# Patient Record
Sex: MALE | Race: WHITE | NOT HISPANIC OR LATINO | Employment: OTHER | URBAN - METROPOLITAN AREA
[De-identification: names, ages, dates, MRNs, and addresses within clinical notes are randomized per-mention and may not be internally consistent; named-entity substitution may affect disease eponyms.]

---

## 2017-01-02 ENCOUNTER — HOME CARE VISIT (OUTPATIENT)
Dept: HOME HEALTH SERVICES | Facility: HOME HEALTHCARE | Age: 77
End: 2017-01-02
Payer: MEDICARE

## 2017-01-02 VITALS — HEART RATE: 80 BPM | DIASTOLIC BLOOD PRESSURE: 60 MMHG | TEMPERATURE: 98.6 F | SYSTOLIC BLOOD PRESSURE: 120 MMHG

## 2017-01-02 PROCEDURE — G0157 HHC PT ASSISTANT EA 15: HCPCS

## 2017-01-03 RX ORDER — MIRTAZAPINE 15 MG/1
TABLET, FILM COATED ORAL
Qty: 30 TAB | Refills: 0 | Status: SHIPPED | OUTPATIENT
Start: 2017-01-03 | End: 2017-03-03 | Stop reason: SDUPTHER

## 2017-01-03 RX ORDER — CLOPIDOGREL BISULFATE 75 MG/1
TABLET ORAL
Qty: 30 TAB | Refills: 0 | Status: SHIPPED | OUTPATIENT
Start: 2017-01-03 | End: 2017-03-03 | Stop reason: SDUPTHER

## 2017-01-03 RX ORDER — DONEPEZIL HYDROCHLORIDE 10 MG/1
TABLET, FILM COATED ORAL
Qty: 30 TAB | Refills: 0 | Status: SHIPPED | OUTPATIENT
Start: 2017-01-03 | End: 2017-03-03 | Stop reason: SDUPTHER

## 2017-01-03 RX ORDER — LISINOPRIL 20 MG/1
TABLET ORAL
Qty: 30 TAB | Refills: 0 | Status: SHIPPED | OUTPATIENT
Start: 2017-01-03 | End: 2017-03-03 | Stop reason: SDUPTHER

## 2017-01-04 ENCOUNTER — HOME CARE VISIT (OUTPATIENT)
Dept: HOME HEALTH SERVICES | Facility: HOME HEALTHCARE | Age: 77
End: 2017-01-04
Payer: MEDICARE

## 2017-01-04 PROCEDURE — G0494 LPN CARE EA 15MIN HH/HOSPICE: HCPCS

## 2017-01-05 ENCOUNTER — HOME CARE VISIT (OUTPATIENT)
Dept: HOME HEALTH SERVICES | Facility: HOME HEALTHCARE | Age: 77
End: 2017-01-05
Payer: MEDICARE

## 2017-01-05 VITALS
DIASTOLIC BLOOD PRESSURE: 81 MMHG | TEMPERATURE: 99.7 F | BODY MASS INDEX: 22.4 KG/M2 | SYSTOLIC BLOOD PRESSURE: 131 MMHG | HEART RATE: 81 BPM | RESPIRATION RATE: 18 BRPM | WEIGHT: 174.5 LBS

## 2017-01-05 VITALS
HEART RATE: 80 BPM | DIASTOLIC BLOOD PRESSURE: 60 MMHG | SYSTOLIC BLOOD PRESSURE: 132 MMHG | TEMPERATURE: 100.1 F | RESPIRATION RATE: 16 BRPM

## 2017-01-05 PROCEDURE — G0151 HHCP-SERV OF PT,EA 15 MIN: HCPCS

## 2017-01-05 SDOH — ECONOMIC STABILITY: HOUSING INSECURITY: UNSAFE APPLIANCES: 0

## 2017-01-05 SDOH — ECONOMIC STABILITY: HOUSING INSECURITY: UNSAFE COOKING RANGE AREA: 0

## 2017-01-05 ASSESSMENT — BALANCE ASSESSMENTS
SURVEY COMPLETE: TRUE
SITTING BALANCE: 1
IMMEDIATE STANDING BALANCE FIRST 5 SECONDS: 1
EYES CLOSED AT MAXIMUM POSITION NUDGED: 1
ATTEMPTS TO ARISE: 1
BALANCE SCORE: 12
ARISES: 2
STANDING BALANCE: 1
SITTING DOWN: 2
TURNING 360 DEGREES STEADINESS: 1
TURNING 360 DEGREES STEPS: 0
NUDGED: 2

## 2017-01-05 ASSESSMENT — GAIT ASSESSMENTS
TRUNK: 0
PATH: 1
LEFT STEP CLEAR: 1
STEP CONTINUITY: 1
SURVEY COMPLETE: TRUE
TIME: 0
RIGHT STEP CLEAR: 1
INITIATION OF GAIT IMMEDIATELY AFTER GO: 1
LEFT STEP PASS: 1
RIGHT STEP PASS: 1
GAIT SCORE: 8
STEP SYMMETRY: 1
BALANCE AND GAIT SCORE: 20

## 2017-01-05 ASSESSMENT — ACTIVITIES OF DAILY LIVING (ADL): TRANSPORTATION COMMENTS: FALL RISK

## 2017-01-05 ASSESSMENT — ENCOUNTER SYMPTOMS
DIFFICULTY THINKING: 1
SEVERE DYSPNEA: 1

## 2017-01-11 ENCOUNTER — HOME CARE VISIT (OUTPATIENT)
Dept: HOME HEALTH SERVICES | Facility: HOME HEALTHCARE | Age: 77
End: 2017-01-11
Payer: MEDICARE

## 2017-01-11 VITALS
DIASTOLIC BLOOD PRESSURE: 70 MMHG | RESPIRATION RATE: 16 BRPM | HEART RATE: 83 BPM | TEMPERATURE: 99.5 F | SYSTOLIC BLOOD PRESSURE: 120 MMHG

## 2017-01-11 PROCEDURE — G0162 HHC RN E&M PLAN SVS, 15 MIN: HCPCS

## 2017-01-11 SDOH — ECONOMIC STABILITY: HOUSING INSECURITY: UNSAFE COOKING RANGE AREA: 0

## 2017-01-11 SDOH — ECONOMIC STABILITY: HOUSING INSECURITY: UNSAFE APPLIANCES: 0

## 2017-01-11 ASSESSMENT — ACTIVITIES OF DAILY LIVING (ADL)
HOME_HEALTH_OASIS: 01
OASIS_M1830: 00
HOME_HEALTH_OASIS: 00

## 2017-05-15 ENCOUNTER — OFFICE VISIT (OUTPATIENT)
Dept: NEUROLOGY | Facility: MEDICAL CENTER | Age: 77
End: 2017-05-15
Payer: MEDICARE

## 2017-05-15 VITALS
WEIGHT: 173 LBS | TEMPERATURE: 98.8 F | DIASTOLIC BLOOD PRESSURE: 78 MMHG | SYSTOLIC BLOOD PRESSURE: 142 MMHG | HEIGHT: 74 IN | BODY MASS INDEX: 22.2 KG/M2 | HEART RATE: 92 BPM | OXYGEN SATURATION: 98 %

## 2017-05-15 DIAGNOSIS — F02.80 DEMENTIA ASSOCIATED WITH OTHER UNDERLYING DISEASE WITHOUT BEHAVIORAL DISTURBANCE (HCC): Primary | ICD-10-CM

## 2017-05-15 PROCEDURE — 99213 OFFICE O/P EST LOW 20 MIN: CPT | Performed by: PSYCHIATRY & NEUROLOGY

## 2017-05-15 PROCEDURE — G8432 DEP SCR NOT DOC, RNG: HCPCS | Performed by: PSYCHIATRY & NEUROLOGY

## 2017-05-15 PROCEDURE — 1100F PTFALLS ASSESS-DOCD GE2>/YR: CPT | Performed by: PSYCHIATRY & NEUROLOGY

## 2017-05-15 PROCEDURE — 4040F PNEUMOC VAC/ADMIN/RCVD: CPT | Performed by: PSYCHIATRY & NEUROLOGY

## 2017-05-15 PROCEDURE — G8420 CALC BMI NORM PARAMETERS: HCPCS | Performed by: PSYCHIATRY & NEUROLOGY

## 2017-05-15 PROCEDURE — G8598 ASA/ANTIPLAT THER USED: HCPCS | Performed by: PSYCHIATRY & NEUROLOGY

## 2017-05-15 PROCEDURE — 0518F FALL PLAN OF CARE DOCD: CPT | Mod: 8P | Performed by: PSYCHIATRY & NEUROLOGY

## 2017-05-15 PROCEDURE — 1036F TOBACCO NON-USER: CPT | Performed by: PSYCHIATRY & NEUROLOGY

## 2017-05-15 PROCEDURE — 3288F FALL RISK ASSESSMENT DOCD: CPT | Performed by: PSYCHIATRY & NEUROLOGY

## 2017-05-15 RX ORDER — DONEPEZIL HYDROCHLORIDE 10 MG/1
TABLET, FILM COATED ORAL
Qty: 30 TAB | Refills: 11 | Status: SHIPPED | OUTPATIENT
Start: 2017-05-15 | End: 2018-01-01 | Stop reason: SDUPTHER

## 2017-05-15 RX ORDER — MEMANTINE HYDROCHLORIDE 28 MG/1
28 CAPSULE, EXTENDED RELEASE ORAL DAILY
Qty: 30 CAP | Refills: 11 | Status: SHIPPED | OUTPATIENT
Start: 2017-05-15 | End: 2018-01-01 | Stop reason: SDUPTHER

## 2017-05-15 NOTE — MR AVS SNAPSHOT
"        Kailash Haile   5/15/2017 10:40 AM   Office Visit   MRN: [id number]    Department:  [facility]   Dept Phone:  [phone]    Description:  Male : 1940   Provider:  Aayush Benavidez M.D.           Reason for Visit     Follow-Up Dementia      Allergies as of 5/15/2017     Allergen Noted Reactions    Ambien [Kdc:Red Dye+Ci Pigment Blue 63+Zolpidem] 2014       Daytime confusion      You were diagnosed with     Dementia associated with other underlying disease without behavioral disturbance   [[id number]]  -  Primary       Vital Signs     Blood Pressure Pulse Temperature Height Weight Body Mass Index    142/78 mmHg 92 37.1 °C (98.8 °F) 1.88 m (6' 2\") 78.472 kg (173 lb) 22.20 kg/m2    Oxygen Saturation Smoking Status                98% Former Smoker          Basic Information     Date Of Birth Sex Race Ethnicity Preferred Language    1940 Male White Non- English      Your appointments     2017 10:00 AM   Established Patient with Rick Wang M.D.   [facility] [address]    [address]  [facility] [address]  [facility] [address]   [phone]           You will be receiving a confirmation call a few days before your appointment from our automated call confirmation system.              Problem List              ICD-10-CM Priority Class Noted - Resolved    CAD (coronary artery disease) I25.10   Unknown - Present    BPH (benign prostatic hyperplasia) N40.0   11/10/2011 - Present    Essential hypertension I10   2012 - Present    COPD (chronic obstructive pulmonary disease) (CMS-HCC) J44.9   2012 - Present    Pulmonary hypertension (CMS-HCC) I27.2   2012 - Present    History of CVA (cerebrovascular accident) Z86.73   2013 - Present    History of MI (myocardial infarction) I25.2   9/3/2014 - Present    Hemiplegia (CMS-HCC) G81.90 High  9/3/2014 - Present    Senile cataract H25.9   2015 - Present    Insomnia G47.00   2015 - " Present    Risk for falls Z91.81   5/5/2015 - Present    Dementia associated with other underlying disease without behavioral disturbance F02.80   12/6/2016 - Present      Health Maintenance        Date Due Completion Dates    IMM ZOSTER VACCINE 5/15/2000 ---    IMM PNEUMOCOCCAL 65+ (ADULT) LOW/MEDIUM RISK SERIES (2 of 2 - PCV13) 9/5/2013 9/5/2012    IMM DTaP/Tdap/Td Vaccine (2 - Td) 9/5/2022 9/5/2012            Current Immunizations     Influenza TIV (IM) 9/29/2013, 9/3/2012, 11/12/2011    Influenza Vaccine Adult HD 9/30/2016    Pneumococcal polysaccharide vaccine (PPSV-23) 9/5/2012    Tdap Vaccine 9/5/2012    Tuberculin Skin Test 1/21/2013, 6/15/2012      Below and/or attached are the medications your provider expects you to take. Review all of your home medications and newly ordered medications with your provider and/or pharmacist. Follow medication instructions as directed by your provider and/or pharmacist. Please keep your medication list with you and share with your provider. Update the information when medications are discontinued, doses are changed, or new medications (including over-the-counter products) are added; and carry medication information at all times in the event of emergency situations     Allergies:  AMBIEN - (reactions not documented)               Medications  Valid as of: May 15, 2017 - 11:13 AM    Generic Name Brand Name Tablet Size Instructions for use    Amoxicillin (Cap) AMOXIL 500 MG Take 500 mg by mouth every day at 6 PM.        Celecoxib (Cap) CELEBREX 200 MG Take 1 Cap by mouth every day.        Cholecalciferol (Tab) vitamin D 2000 UNIT TAKE 1 TABLET BY MOUTH ONCE DAILY.        Clopidogrel Bisulfate (Tab) PLAVIX 75 MG TAKE 1 TABLET BY MOUTH ONCE DAILY.        Diclofenac Sodium (Gel) Diclofenac Sodium 1 % Apply 1 Inch to skin as directed 4 times a day.        Donepezil HCl (Tab) ARICEPT 10 MG TAKE 1 TABLET BY MOUTH ONCE DAILY.        Lisinopril (Tab) PRINIVIL 20 MG TAKE 1 TABLET BY  MOUTH ONCE DAILY.        Melatonin (Tab) melatonin 3 MG Take 3 mg by mouth every day. at bedtime for sleep        Memantine HCl (CAPSULE SR 24 HR) NAMENDA 28 MG Take 1 Cap by mouth every day.        Mirtazapine (Tab) REMERON 15 MG TAKE 1 TABLET BY MOUTH AT BEDTIME.        Simvastatin (Tab) ZOCOR 20 MG TAKE 1 TABLET BY MOUTH IN THE EVENING.        .                 Medicines prescribed today were sent to:     [facility] - [place], [place] - [address] [facility]    [address] [place] [address]    Phone: [phone] Fax: [phone]    Open 24 Hours?: No      Medication refill instructions:       If your prescription bottle indicates you have medication refills left, it is not necessary to call your provider’s office. Please contact your pharmacy and they will refill your medication.    If your prescription bottle indicates you do not have any refills left, you may request refills at any time through one of the following ways: The online [employer] system (except Urgent Care), by calling your provider’s office, or by asking your pharmacy to contact your provider’s office with a refill request. Medication refills are processed only during regular business hours and may not be available until the next business day. Your provider may request additional information or to have a follow-up visit with you prior to refilling your medication.   *Please Note: Medication refills are assigned a new Rx number when refilled electronically. Your pharmacy may indicate that no refills were authorized even though a new prescription for the same medication is available at the pharmacy. Please request the medicine by name with the pharmacy before contacting your provider for a refill.        Other Notes About Your Plan     This appointment is 6-6-16    I69.959 Hemiplegia following CVA  I27.9 Chronic Pulmonary Heart Disease    Query: Dx'd with Myelodysplastic Syndrome during acute stay in 2014--in your medical opinion  is this an active-valid dx D46.9  Patient is enrolled in [facility] with Dr Felipe Patton Access Code: Activation code not generated  Current Pooja Status: Active

## 2017-05-15 NOTE — PROGRESS NOTES
"Subjective:      Kailash Haile is a 77 y.o. male who presents with his caretaker Zohreh, for follow-up, with a history of Alzheimer's disease.     HPI    Kailash continues to do well at his residence facility, he still lives in his own apartment. He is requiring some more cueing comes to daily activities such as bathing. He is still dressing himself appropriately, but he is also not shaving as often. He is eating consistently. He has not been found wandering, there have been no reports of agitated delirium. He denies nightmares. He is still getting around using his walker, has not been falling with regularity. There are no issues with incontinence. He reads avidly, does attend some of the music engagements at the common room. He still tends to be by himself. Namenda XR was started 6 months ago, he tolerates the 28 mg daily dosing without issue. He is remaining on Aricept 10 mg daily at bedtime without issue throughout. Zohreh still accompanies him when he is out seeing physicians or otherwise being social, she will see him at the care facility where he is a resident since she has other clients as well. He does recognize her, but has difficulty remembering names.    There are no reports of transient focal neurologic deficit. He has been tolerating his Plavix and Zocor without issue.    Medical, surgical and family histories are reviewed, there are no new drug allergies. He is on Namenda XR 20 mg daily, Aricept 10 mg daily at bedtime, Remeron, Plavix, Zocor, Prinivil, the rest as per the electronic health record.    Review of Systems   Unable to perform ROS: dementia        Objective:     /78 mmHg  Pulse 92  Temp(Src) 37.1 °C (98.8 °F)  Ht 1.88 m (6' 2\")  Wt 78.472 kg (173 lb)  BMI 22.20 kg/m2  SpO2 98%     Physical Exam    He appears in no acute distress. Clean and appropriately dressed, he is quite cooperative. His vital signs are stable. There is no malar rash. The neck is supple. Cardiac " evaluation is unremarkable. He is now oriented only to place, even though today is his birthday, he cannot calculate his age appropriately. There is no aphasia or inattention. He does repeat himself throughout the interview. He can be a little perseverative. Visual fields are full, PERRLA/EOMI, there is no facial asymmetry or bulbar dysfunction. Hemiparesis is unchanged, he walks with a walker, there is still significant postural instability. There is no appendicular dystaxia.     Assessment/Plan:     1. Dementia associated with other underlying disease without behavioral disturbance  I will continue the Namenda and donepezil combination, hopefully we can maintain his level of stable independence for a while longer. He does not require other symptomatic relief at this time. They will follow-up in one year.    - Memantine HCl ER (NAMENDA XR) 28 MG CAPSULE SR 24 HR; Take 1 Cap by mouth every day.  Dispense: 30 Cap; Refill: 11  - donepezil (ARICEPT) 10 MG tablet; TAKE 1 TABLET BY MOUTH ONCE DAILY.  Dispense: 30 Tab; Refill: 11    Time: Evaluation of 20 minutes were exam, review, discussion, and education  Discussion: Assessment in assessment, over 50% of the time spent face-to-face counseling and correlating care

## 2017-06-08 ENCOUNTER — OFFICE VISIT (OUTPATIENT)
Dept: MEDICAL GROUP | Facility: MEDICAL CENTER | Age: 77
End: 2017-06-08
Payer: MEDICARE

## 2017-06-08 VITALS
RESPIRATION RATE: 16 BRPM | WEIGHT: 174 LBS | OXYGEN SATURATION: 94 % | BODY MASS INDEX: 22.33 KG/M2 | TEMPERATURE: 99 F | HEART RATE: 85 BPM | DIASTOLIC BLOOD PRESSURE: 68 MMHG | HEIGHT: 74 IN | SYSTOLIC BLOOD PRESSURE: 136 MMHG

## 2017-06-08 DIAGNOSIS — J44.9 CHRONIC OBSTRUCTIVE PULMONARY DISEASE, UNSPECIFIED COPD TYPE (HCC): ICD-10-CM

## 2017-06-08 DIAGNOSIS — I27.20 PULMONARY HYPERTENSION (HCC): ICD-10-CM

## 2017-06-08 DIAGNOSIS — Z86.73 HISTORY OF CVA (CEREBROVASCULAR ACCIDENT): ICD-10-CM

## 2017-06-08 DIAGNOSIS — I10 ESSENTIAL HYPERTENSION: ICD-10-CM

## 2017-06-08 DIAGNOSIS — F02.80 DEMENTIA ASSOCIATED WITH OTHER UNDERLYING DISEASE WITHOUT BEHAVIORAL DISTURBANCE (HCC): ICD-10-CM

## 2017-06-08 DIAGNOSIS — N40.1 BENIGN PROSTATIC HYPERPLASIA WITH LOWER URINARY TRACT SYMPTOMS, UNSPECIFIED MORPHOLOGY: ICD-10-CM

## 2017-06-08 DIAGNOSIS — Z91.81 RISK FOR FALLS: ICD-10-CM

## 2017-06-08 DIAGNOSIS — Z13.220 SCREENING, LIPID: ICD-10-CM

## 2017-06-08 DIAGNOSIS — I69.351 HEMIPARESIS AFFECTING RIGHT SIDE AS LATE EFFECT OF STROKE (HCC): ICD-10-CM

## 2017-06-08 DIAGNOSIS — I25.10 CORONARY ARTERY DISEASE INVOLVING NATIVE CORONARY ARTERY OF NATIVE HEART WITHOUT ANGINA PECTORIS: ICD-10-CM

## 2017-06-08 DIAGNOSIS — Z00.00 MEDICARE ANNUAL WELLNESS VISIT, SUBSEQUENT: ICD-10-CM

## 2017-06-08 DIAGNOSIS — F51.01 PRIMARY INSOMNIA: ICD-10-CM

## 2017-06-08 PROCEDURE — G8598 ASA/ANTIPLAT THER USED: HCPCS | Performed by: INTERNAL MEDICINE

## 2017-06-08 PROCEDURE — 1036F TOBACCO NON-USER: CPT | Performed by: INTERNAL MEDICINE

## 2017-06-08 PROCEDURE — 99213 OFFICE O/P EST LOW 20 MIN: CPT | Mod: 25 | Performed by: INTERNAL MEDICINE

## 2017-06-08 PROCEDURE — G8420 CALC BMI NORM PARAMETERS: HCPCS | Performed by: INTERNAL MEDICINE

## 2017-06-08 PROCEDURE — 1101F PT FALLS ASSESS-DOCD LE1/YR: CPT | Performed by: INTERNAL MEDICINE

## 2017-06-08 PROCEDURE — 4040F PNEUMOC VAC/ADMIN/RCVD: CPT | Performed by: INTERNAL MEDICINE

## 2017-06-08 PROCEDURE — G0439 PPPS, SUBSEQ VISIT: HCPCS | Performed by: INTERNAL MEDICINE

## 2017-06-08 ASSESSMENT — PATIENT HEALTH QUESTIONNAIRE - PHQ9: CLINICAL INTERPRETATION OF PHQ2 SCORE: 0

## 2017-06-08 NOTE — PROGRESS NOTES
CC: Follow-up hypertension due for wellness examination.    HPI:   Kailash presents today with the following.    1. Medicare annual wellness visit, subsequent  Screenings performed below. Patient presents with caregiver.    2. Essential hypertension  Blood pressure slightly high and neurologists office and initially in the 150s. He does not take blood pressure at home. Denying any chest pain or shortness breath no edema blood pressure did come down significantly on recheck.    3. Coronary artery disease involving native coronary artery of native heart without angina pectoris  Denying any chest pain or shortness of breath no edema.    4. Pulmonary hypertension (CMS-[facility])  Again asymptomatic.    5. Benign prostatic hyperplasia with lower urinary tract symptoms, unspecified morphology  Urinary symptoms are stable denying any increase in frequency.     6. Chronic obstructive pulmonary disease, unspecified COPD type (CMS-HCC)  Breathing is at baseline with no exacerbation.    7. Dementia associated with other underlying disease without behavioral disturbance  He is followed by neurology maintain on medication last visit one month ago and found to be stable. Still quite pleasant and conversational.    8. History of CVA (cerebrovascular accident)  Maintain on Plavix denying any new neurologic symptoms.    9. Hemiparesis affecting right side as late effect of stroke (CMS-HCC)  Still is ambulatory but is a fall risk is resolved haziness has used but decreased the severity and strength.    10. Risk for falls  Using a walker denying any recent falls.    11. Primary insomnia  Port sleep is doing well on current medications        Depression Screening    Little interest or pleasure in doing things?  0 - not at all  Feeling down, depressed , or hopeless?    Trouble falling or staying asleep, or sleeping too much?     Feeling tired or having little energy?     Poor appetite or overeating?     Feeling bad about yourself - or that  you are a failure or have let yourself or your family down?    Trouble concentrating on things, such as reading the newspaper or watching television?    Moving or speaking so slowly that other people could have noticed.  Or the opposite - being so fidgety or restless that you have been moving around a lot more than usual?     Thoughts that you would be better off dead, or of hurting yourself?     Patient Health Questionnaire Score:    If depressive symptoms identified deferred to follow up visit unless specifically addressed in assessment and plan.    Interpretation of PHQ-9 Total Score   Score Severity   1-4 Minimal Depression   5-9 Mild Depression   10-14 Moderate Depression   15-19 Moderately Severe Depression   20-27 Severe Depression    Screening for Cognitive Impairment    Three Minute Recall (banana, sunrise, fence)  1/3    Draw clock face with all 12 numbers set to the hand to show 10 minures past 11 o'clock  0    If cognitive concerns identified deferred to follow up visit unless specifically addressed in assessment and plan.    Fall Risk Assessment    Has the patient had two or more falls in the last year or any fall with injury in the last year?  No  If Fall Risk identified deferred to follow up visit unless specifically addressed in assessment and plan.    Safety Assessment    Throw rugs on floor.  No  Handrails on all stairs.  Yes  Good lighting in all hallways.  Yes  Difficulty hearing.  No  Patient counseled about all safety risks that were identified.    Functional Assessment ADLs    Are there any barriers preventing you from cooking for yourself or meeting nutritional needs?  No.    Are there any barriers preventing you from driving safely or obtaining transportation?  No.    Are there any barriers preventing you from using a telephone or calling for help?  No.    Are there any barriers preventing you from shopping?  No.    Are there any barriers preventing you from taking care of your own finances?   No.    Are there any barriers preventing you from managing your medications?  No.    Are currently engaing any exercise or physical activity?  Yes.       Health Maintenance Summary                PFT SCREENING-FEV1 AND FEV/FVC RATIO / SPIROMETRY SHOULD BE PERFORMED ANNUALLY Overdue 5/15/1958     IMM ZOSTER VACCINE Overdue 5/15/2000     Annual Wellness Visit Overdue 6/7/2017      Done 6/6/2016 Visit Dx: Medicare annual wellness visit, subsequent     Patient has more history with this topic...    IMM PNEUMOCOCCAL 65+ (ADULT) LOW/MEDIUM RISK SERIES Next Due 10/2/2017      Done 10/2/2016 Imm Admin: Pneumococcal polysaccharide vaccine (PPSV-23)     Patient has more history with this topic...    IMM DTaP/Tdap/Td Vaccine Next Due 9/5/2022      Done 9/5/2012 Imm Admin: Tdap Vaccine          Patient Care Team:  Rick Wang M.D. as PCP - General (Internal Medicine)  Rachna Hagen O.D. as Consulting Physician (Optometry)  Aayush Benavidez M.D. as Consulting Physician (Neurology)  Devon Horowitz M.D. as Consulting Physician (Orthopaedics)      Patient Active Problem List    Diagnosis Date Noted   • Benign prostatic hyperplasia with lower urinary tract symptoms 06/08/2017   • Chronic obstructive pulmonary disease (CMS-HCC) 06/08/2017   • Hemiparesis affecting right side as late effect of stroke (CMS-HCC) 06/08/2017   • Dementia associated with other underlying disease without behavioral disturbance 12/06/2016   • Risk for falls 05/05/2015   • Primary insomnia 04/14/2015   • Senile cataract 04/07/2015   • History of MI (myocardial infarction) 09/03/2014   • History of CVA (cerebrovascular accident) 12/27/2013   • Essential hypertension 05/28/2012   • Pulmonary hypertension (CMS-HCC) 05/28/2012   • CAD (coronary artery disease)        Current Outpatient Prescriptions   Medication Sig Dispense Refill   • celecoxib (CELEBREX) 200 MG Cap TAKE (1) CAPSULE BY MOUTH ONCE DAILY. 30 Cap 6   • Memantine HCl ER (NAMENDA XR) 28  "MG CAPSULE SR 24 HR Take 1 Cap by mouth every day. 30 Cap 11   • donepezil (ARICEPT) 10 MG tablet TAKE 1 TABLET BY MOUTH ONCE DAILY. 30 Tab 11   • simvastatin (ZOCOR) 20 MG Tab TAKE 1 TABLET BY MOUTH IN THE EVENING. 90 Tab 3   • Cholecalciferol (VITAMIN D) 2000 UNIT Tab TAKE 1 TABLET BY MOUTH ONCE DAILY. 30 Tab 3   • mirtazapine (REMERON) 15 MG Tab TAKE 1 TABLET BY MOUTH AT BEDTIME. 30 Tab 6   • clopidogrel (PLAVIX) 75 MG Tab TAKE 1 TABLET BY MOUTH ONCE DAILY. 30 Tab 11   • lisinopril (PRINIVIL) 20 MG Tab TAKE 1 TABLET BY MOUTH ONCE DAILY. 30 Tab 11   • melatonin 3 MG Tab Take 3 mg by mouth every day. at bedtime for sleep     • Diclofenac Sodium (VOLTAREN) 1 % Gel Apply 1 Inch to skin as directed 4 times a day. 5 Tube 6     No current facility-administered medications for this visit.         Allergies as of 06/08/2017 -  as Reviewed 06/08/2017   Allergen Reaction Noted   • Ambien [kdc:red dye+ci pigment blue 63+zolpidem]  12/04/2014        ROS: As per HPI.    /68 mmHg  Pulse 85  Temp(Src) 37.2 °C (99 °F)  Resp 16  Ht 1.88 m (6' 2.02\")  Wt 78.926 kg (174 lb)  BMI 22.33 kg/m2  SpO2 94%    Physical Exam:  Gen:         Alert and oriented, No apparent distress.  Neck:        No Lymphadenopathy or Bruits.  Lungs:     Clear to auscultation bilaterally  CV:          Regular rate and rhythm. No murmurs, rubs or gallops.  Abd:         Soft non tender, non distended. Normal active bowel sounds.  No  Hepatosplenomegaly, No pulsatile masses.                   Ext:          No clubbing, cyanosis, edema.      Assessment and Plan.   77 y.o. male with the following issues.    1. Medicare annual wellness visit, subsequent  Discussed healthy lifestyle habits as well as screening regimens.  - Annual Wellness Visit - Includes PPPS Subsequent ()    2. Essential hypertension  Was recently started on Celebrex caregivers will begin monitoring blood pressure may increase lisinopril if found to be elevated.    3. " Coronary artery disease involving native coronary artery of native heart without angina pectoris  Currently stable no change therapy.  - Annual Wellness Visit - Includes PPPS Subsequent ()    4. Pulmonary hypertension (CMS-HCC)  Continue risk reductions.  - Annual Wellness Visit - Includes PPPS Subsequent ()    5. Benign prostatic hyperplasia with lower urinary tract symptoms, unspecified morphology  Clinically stable  - Annual Wellness Visit - Includes PPPS Subsequent ()    6. Chronic obstructive pulmonary disease, unspecified COPD type (CMS-HCC)  Stable  - Annual Wellness Visit - Includes PPPS Subsequent ()    7. Dementia associated with other underlying disease without behavioral disturbance  Followed by neurology with no acute worsening  - Annual Wellness Visit - Includes PPPS Subsequent ()    8. History of CVA (cerebrovascular accident)  No new symptomology  - Annual Wellness Visit - Includes PPPS Subsequent ()    9. Hemiparesis affecting right side as late effect of stroke (CMS-HCC)  No change to symptoms  - Annual Wellness Visit - Includes PPPS Subsequent ()    10. Risk for falls  Follow-up caution given  - Annual Wellness Visit - Includes PPPS Subsequent ()    11. Primary insomnia  Currently stable on medication without ill side effect.    12. Screening, lipid    - COMP METABOLIC PANEL; Future  - LIPID PROFILE; Future

## 2017-06-08 NOTE — MR AVS SNAPSHOT
"        Kailash Razooghue   2017 10:00 AM   Office Visit   MRN: [id number]    Department:  [address] [place] [address]   Dept Phone:  [phone]    Description:  Male : 1940   Provider:  Rick Wang M.D.           Reason for Visit     Follow-Up 6 month      Allergies as of 2017     Allergen Noted Reactions    Ambien [Kdc:Red Dye+Ci Pigment Blue 63+Zolpidem] 2014       Daytime confusion      You were diagnosed with     Medicare annual wellness visit, subsequent   [490790]       Essential hypertension   [1555279]       Coronary artery disease involving native coronary artery of native heart without angina pectoris   [3428609]       Pulmonary hypertension (CMS-HCC)   [292064]       Benign prostatic hyperplasia with lower urinary tract symptoms, unspecified morphology   [8967762]       Chronic obstructive pulmonary disease, unspecified COPD type (CMS-[facility])   [9538860]       Dementia associated with other underlying disease without behavioral disturbance   [9826876]       History of CVA (cerebrovascular accident)   [039706]       Hemiparesis affecting right side as late effect of stroke (CMS-[facility])   [921909]       Risk for falls   [897356]       Primary insomnia   [115654]       Screening, lipid   [856646]         Vital Signs     Blood Pressure Pulse Temperature Respirations Height Weight    136/68 mmHg 85 37.2 °C (99 °F) 16 1.88 m (6' 2.02\") 78.926 kg (174 lb)    Body Mass Index Oxygen Saturation Smoking Status             22.33 kg/m2 94% Former Smoker         Basic Information     Date Of Birth Sex Race Ethnicity Preferred Language    1940 Male White Non- English      Your appointments     Dec 08, 2017  9:00 AM   Established Patient with Rick Wang M.D.   [facility] [address]    [address]  [facility] [address]  [place] [address]   [phone]           You will be receiving a confirmation call a few days before your appointment from our automated call " confirmation system.              Problem List              ICD-10-CM Priority Class Noted - Resolved    CAD (coronary artery disease) I25.10   Unknown - Present    Essential hypertension I10   5/28/2012 - Present    Pulmonary hypertension (CMS-HCC) I27.2   5/28/2012 - Present    History of CVA (cerebrovascular accident) Z86.73   12/27/2013 - Present    History of MI (myocardial infarction) I25.2   9/3/2014 - Present    Senile cataract H25.9   4/7/2015 - Present    Primary insomnia F51.01   4/14/2015 - Present    Risk for falls Z91.81   5/5/2015 - Present    Dementia associated with other underlying disease without behavioral disturbance F02.80   12/6/2016 - Present    Benign prostatic hyperplasia with lower urinary tract symptoms N40.1   6/8/2017 - Present    Chronic obstructive pulmonary disease (CMS-HCC) J44.9   6/8/2017 - Present    Hemiparesis affecting right side as late effect of stroke (CMS-HCC) I69.351   6/8/2017 - Present      Health Maintenance        Date Due Completion Dates    IMM ZOSTER VACCINE 5/15/2000 ---    IMM PNEUMOCOCCAL 65+ (ADULT) LOW/MEDIUM RISK SERIES (2 of 2 - PCV13) 10/2/2017 10/2/2016, 9/5/2012    IMM DTaP/Tdap/Td Vaccine (2 - Td) 9/5/2022 9/5/2012            Current Immunizations     Influenza TIV (IM) 9/29/2013, 9/3/2012, 11/12/2011    Influenza Vaccine Adult HD 9/30/2016    Pneumococcal polysaccharide vaccine (PPSV-23) 10/2/2016, 9/5/2012    Tdap Vaccine 9/5/2012    Tuberculin Skin Test 1/21/2013, 6/15/2012      Below and/or attached are the medications your provider expects you to take. Review all of your home medications and newly ordered medications with your provider and/or pharmacist. Follow medication instructions as directed by your provider and/or pharmacist. Please keep your medication list with you and share with your provider. Update the information when medications are discontinued, doses are changed, or new medications (including over-the-counter products) are added; and  carry medication information at all times in the event of emergency situations     Allergies:  AMBIEN - (reactions not documented)               Medications  Valid as of: June 08, 2017 - 10:23 AM    Generic Name Brand Name Tablet Size Instructions for use    Celecoxib (Cap) CELEBREX 200 MG TAKE (1) CAPSULE BY MOUTH ONCE DAILY.        Cholecalciferol (Tab) vitamin D 2000 UNIT TAKE 1 TABLET BY MOUTH ONCE DAILY.        Clopidogrel Bisulfate (Tab) PLAVIX 75 MG TAKE 1 TABLET BY MOUTH ONCE DAILY.        Diclofenac Sodium (Gel) Diclofenac Sodium 1 % Apply 1 Inch to skin as directed 4 times a day.        Donepezil HCl (Tab) ARICEPT 10 MG TAKE 1 TABLET BY MOUTH ONCE DAILY.        Lisinopril (Tab) PRINIVIL 20 MG TAKE 1 TABLET BY MOUTH ONCE DAILY.        Melatonin (Tab) melatonin 3 MG Take 3 mg by mouth every day. at bedtime for sleep        Memantine HCl (CAPSULE SR 24 HR) NAMENDA 28 MG Take 1 Cap by mouth every day.        Mirtazapine (Tab) REMERON 15 MG TAKE 1 TABLET BY MOUTH AT BEDTIME.        Simvastatin (Tab) ZOCOR 20 MG TAKE 1 TABLET BY MOUTH IN THE EVENING.        .                 Medicines prescribed today were sent to:     [place], [place] - [address] [facility]    [address] [place] [address]    Phone: [phone] Fax: [phone]    Open 24 Hours?: No      Medication refill instructions:       If your prescription bottle indicates you have medication refills left, it is not necessary to call your provider’s office. Please contact your pharmacy and they will refill your medication.    If your prescription bottle indicates you do not have any refills left, you may request refills at any time through one of the following ways: The online [employer] system (except Urgent Care), by calling your provider’s office, or by asking your pharmacy to contact your provider’s office with a refill request. Medication refills are processed only during regular business hours and may not be available  until the next business day. Your provider may request additional information or to have a follow-up visit with you prior to refilling your medication.   *Please Note: Medication refills are assigned a new Rx number when refilled electronically. Your pharmacy may indicate that no refills were authorized even though a new prescription for the same medication is available at the pharmacy. Please request the medicine by name with the pharmacy before contacting your provider for a refill.        Your To Do List     Future Labs/Procedures Complete By Expires    COMP METABOLIC PANEL  As directed 6/9/2018    LIPID PROFILE  As directed 6/9/2018      Other Notes About Your Plan     This appointment is 6-6-16    I69.959 Hemiplegia following CVA  I27.9 Chronic Pulmonary Heart Disease    Query: Dx'd with Myelodysplastic Syndrome during acute stay in 2014--in your medical opinion is this an active-valid dx D46.9  Patient is enrolled in [facility] with Dr Felipe Patton Access Code: Activation code not generated  Current Pooja Status: Active

## 2017-07-17 ENCOUNTER — PATIENT OUTREACH (OUTPATIENT)
Dept: HEALTH INFORMATION MANAGEMENT | Facility: OTHER | Age: 77
End: 2017-07-17

## 2017-07-17 NOTE — PROGRESS NOTES
Outbound call to Kailash for medication review. Unable to reach patient - he is residing in an assisted living facility.     Claritza Ma, KORID

## 2017-08-30 RX ORDER — MIRTAZAPINE 15 MG/1
TABLET, FILM COATED ORAL
Qty: 30 TAB | Refills: 3 | Status: SHIPPED
Start: 2017-08-30 | End: 2017-12-27 | Stop reason: SDUPTHER

## 2017-08-30 RX ORDER — CHOLECALCIFEROL (VITAMIN D3) 50 MCG
TABLET ORAL
Qty: 30 TAB | Refills: 11 | Status: SHIPPED | OUTPATIENT
Start: 2017-08-30 | End: 2018-01-01 | Stop reason: SDUPTHER

## 2017-08-30 NOTE — TELEPHONE ENCOUNTER
.Was the patient seen in the last year in this department? Yes     Does patient have an active prescription for medications requested? No     Received Request Via: Pharmacy

## 2017-09-21 ENCOUNTER — OFFICE VISIT (OUTPATIENT)
Dept: MEDICAL GROUP | Facility: MEDICAL CENTER | Age: 77
End: 2017-09-21
Payer: MEDICARE

## 2017-09-21 VITALS
HEIGHT: 74 IN | OXYGEN SATURATION: 95 % | SYSTOLIC BLOOD PRESSURE: 120 MMHG | RESPIRATION RATE: 16 BRPM | BODY MASS INDEX: 22.33 KG/M2 | WEIGHT: 174 LBS | DIASTOLIC BLOOD PRESSURE: 80 MMHG | TEMPERATURE: 97.4 F | HEART RATE: 89 BPM

## 2017-09-21 DIAGNOSIS — F02.80 DEMENTIA ASSOCIATED WITH OTHER UNDERLYING DISEASE WITHOUT BEHAVIORAL DISTURBANCE (HCC): ICD-10-CM

## 2017-09-21 DIAGNOSIS — I10 ESSENTIAL HYPERTENSION: ICD-10-CM

## 2017-09-21 DIAGNOSIS — I25.10 CORONARY ARTERY DISEASE INVOLVING NATIVE CORONARY ARTERY OF NATIVE HEART WITHOUT ANGINA PECTORIS: ICD-10-CM

## 2017-09-21 DIAGNOSIS — Z23 NEED FOR PNEUMOCOCCAL VACCINE: ICD-10-CM

## 2017-09-21 DIAGNOSIS — Z01.818 PRE-OP EVALUATION: ICD-10-CM

## 2017-09-21 PROCEDURE — G0009 ADMIN PNEUMOCOCCAL VACCINE: HCPCS | Performed by: INTERNAL MEDICINE

## 2017-09-21 PROCEDURE — 90670 PCV13 VACCINE IM: CPT | Performed by: INTERNAL MEDICINE

## 2017-09-21 PROCEDURE — 99214 OFFICE O/P EST MOD 30 MIN: CPT | Mod: 25 | Performed by: INTERNAL MEDICINE

## 2017-09-21 NOTE — PROGRESS NOTES
CC: Preop evaluation hip surgery.    HPI:   Kailash presents today with the following.    1. Pre-op evaluation  Patient is seen his orthopedist with plans for transforming a partial hip to total hip. There is no surgery date as of yet.    2. Dementia associated with other underlying disease without behavioral disturbance  Patient does suffer from dementia and is currently living in assisted living but does not have recurrent care. He is prone to severe exacerbations of dementia.    3. Coronary artery disease involving native coronary artery of native heart without angina pectoris  He does suffer from coronary artery disease has been referred to cardiology but has not made an appointment. He denies any chest pain or shortness of breath with ambulation but cannot get sufficient activity for adequate evaluation.     4. Essential hypertension  Blood pressure currently well-controlled again denying any chest pain shortness of breath or edema.    5. Need for pneumococcal vaccine        Patient Active Problem List    Diagnosis Date Noted   • Benign prostatic hyperplasia with lower urinary tract symptoms 06/08/2017   • Chronic obstructive pulmonary disease (CMS-HCC) 06/08/2017   • Hemiparesis affecting right side as late effect of stroke (CMS-HCC) 06/08/2017   • Dementia associated with other underlying disease without behavioral disturbance 12/06/2016   • Risk for falls 05/05/2015   • Primary insomnia 04/14/2015   • Senile cataract 04/07/2015   • History of MI (myocardial infarction) 09/03/2014   • History of CVA (cerebrovascular accident) 12/27/2013   • Essential hypertension 05/28/2012   • Pulmonary hypertension (CMS-HCC) 05/28/2012   • CAD (coronary artery disease)        Current Outpatient Prescriptions   Medication Sig Dispense Refill   • mirtazapine (REMERON) 15 MG Tab TAKE 1 TABLET BY MOUTH AT BEDTIME. 30 Tab 3   • Cholecalciferol (VITAMIN D) 2000 UNIT Tab TAKE 1 TABLET BY MOUTH ONCE DAILY. 30 Tab 11   • celecoxib  "(CELEBREX) 200 MG Cap TAKE (1) CAPSULE BY MOUTH ONCE DAILY. 30 Cap 6   • Memantine HCl ER (NAMENDA XR) 28 MG CAPSULE SR 24 HR Take 1 Cap by mouth every day. 30 Cap 11   • donepezil (ARICEPT) 10 MG tablet TAKE 1 TABLET BY MOUTH ONCE DAILY. 30 Tab 11   • simvastatin (ZOCOR) 20 MG Tab TAKE 1 TABLET BY MOUTH IN THE EVENING. 90 Tab 3   • clopidogrel (PLAVIX) 75 MG Tab TAKE 1 TABLET BY MOUTH ONCE DAILY. 30 Tab 11   • lisinopril (PRINIVIL) 20 MG Tab TAKE 1 TABLET BY MOUTH ONCE DAILY. 30 Tab 11   • melatonin 3 MG Tab Take 3 mg by mouth every day. at bedtime for sleep     • Diclofenac Sodium (VOLTAREN) 1 % Gel Apply 1 Inch to skin as directed 4 times a day. 5 Tube 6     No current facility-administered medications for this visit.          Allergies as of 09/21/2017 - Reviewed 09/21/2017   Allergen Reaction Noted   • Ambien [kdc:red dye+ci pigment blue 63+zolpidem]  12/04/2014        ROS: As per HPI.    /80   Pulse 89   Temp 36.3 °C (97.4 °F)   Resp 16   Ht 1.88 m (6' 2\")   Wt 78.9 kg (174 lb)   SpO2 95%   BMI 22.34 kg/m²     Physical Exam:  Gen:         Alert and oriented, No apparent distress.  Neck:        No Lymphadenopathy or Bruits.  Lungs:     Clear to auscultation bilaterally  CV:          Regular rate and rhythm. No murmurs, rubs or gallops.               Ext:          No clubbing, cyanosis, edema.      Assessment and Plan.   77 y.o. male with the following issues.    1. Pre-op evaluation  Patient will need final clearance by cardiology referral was placed today. In terms of his dementia believe he is only safe to do so if he is admitted to a rehabilitation facility where he can get close monitoring postoperatively. He is considered high risk for postsurgical events which I believe rehabilitation could circumvent.  - REFERRAL TO CARDIOLOGY    2. Dementia associated with other underlying disease without behavioral disturbance  And as mentioned above only released for surgery if admitted to rehabilitation " facility.    3. Coronary artery disease involving native coronary artery of native heart without angina pectoris  Cardiology to have final word on clearance from cardiac standpoint for surgery.  - REFERRAL TO CARDIOLOGY    4. Essential hypertension  Currently well controlled, Discuss diet, exercise and salt restriction    5. Need for pneumococcal vaccine    - PNEUMOCOCCAL CONJUGATE VACCINE 13-VALENT

## 2017-10-23 ENCOUNTER — OFFICE VISIT (OUTPATIENT)
Dept: CARDIOLOGY | Facility: MEDICAL CENTER | Age: 77
End: 2017-10-23
Payer: MEDICARE

## 2017-10-23 VITALS
DIASTOLIC BLOOD PRESSURE: 78 MMHG | OXYGEN SATURATION: 93 % | WEIGHT: 173 LBS | HEIGHT: 74 IN | BODY MASS INDEX: 22.2 KG/M2 | HEART RATE: 88 BPM | SYSTOLIC BLOOD PRESSURE: 138 MMHG

## 2017-10-23 DIAGNOSIS — I25.10 CORONARY ARTERY DISEASE INVOLVING NATIVE CORONARY ARTERY OF NATIVE HEART WITHOUT ANGINA PECTORIS: ICD-10-CM

## 2017-10-23 DIAGNOSIS — J44.9 CHRONIC OBSTRUCTIVE PULMONARY DISEASE, UNSPECIFIED COPD TYPE (HCC): ICD-10-CM

## 2017-10-23 DIAGNOSIS — I25.2 HISTORY OF MI (MYOCARDIAL INFARCTION): ICD-10-CM

## 2017-10-23 DIAGNOSIS — Z86.73 HISTORY OF CVA (CEREBROVASCULAR ACCIDENT): ICD-10-CM

## 2017-10-23 DIAGNOSIS — I10 ESSENTIAL HYPERTENSION: ICD-10-CM

## 2017-10-23 DIAGNOSIS — F02.80 DEMENTIA ASSOCIATED WITH OTHER UNDERLYING DISEASE WITHOUT BEHAVIORAL DISTURBANCE (HCC): ICD-10-CM

## 2017-10-23 PROCEDURE — 93000 ELECTROCARDIOGRAM COMPLETE: CPT | Performed by: INTERNAL MEDICINE

## 2017-10-23 PROCEDURE — 99204 OFFICE O/P NEW MOD 45 MIN: CPT | Performed by: INTERNAL MEDICINE

## 2017-10-23 RX ORDER — INFLUENZA A VIRUS A/MICHIGAN/45/2015 X-275 (H1N1) ANTIGEN (FORMALDEHYDE INACTIVATED), INFLUENZA A VIRUS A/SINGAPORE/INFIMH-16-0019/2016 IVR-186 (H3N2) ANTIGEN (FORMALDEHYDE INACTIVATED), AND INFLUENZA B VIRUS B/MARYLAND/15/2016 BX-69A (A B/COLORADO/6/2017-LIKE VIRUS) ANTIGEN (FORMALDEHYDE INACTIVATED) 60; 60; 60 UG/.5ML; UG/.5ML; UG/.5ML
INJECTION, SUSPENSION INTRAMUSCULAR
COMMUNITY
Start: 2017-09-03 | End: 2018-01-01

## 2017-10-23 NOTE — PROGRESS NOTES
Subjective:   Kailash Haile is a 77 y.o. male who presents today For preop evaluation prior to hip surgery. The patient has had a partial hip replacement in the past but needs to have that revised because of chronic pain which bothers him mostly at night. This causes an interruption in his usual sleep pattern. The patient has a history of high blood pressure but his blood pressure is been well controlled lately on lisinopril. He also has hyperlipidemia currently on simvastatin. The patient has dementia of the Alzheimer's variety so details of his past history are difficult to obtain. There is a history of a myocardial infarction manifested by chest discomfort which led the patient to go to the hospital. He says it was 2 and a half years ago but the chart indicates that it was in 1990.. He has had no symptoms of exertional chest pain pressure or tightness similar to what he experienced in the past. He can walk using his walker with no chest pain and no exertional breathlessness. There is also a history of a stroke in 2012 manifested by left sided weakness. He may have had another stroke but that is gathered from the previous record.    The patient has no dyspnea on exertion, orthopnea, PND, pedal edema. He has no symptoms suggesting TIA or stroke at this point and there is nothing to suggest claudication as a symptom. His activity is limited by hip pain which requires him to use a walker. The patient has a history of venous thrombosis with possible embolism as a cause for stroke in 2012. A patent foramen has not been described. He has been on no anticoagulation although he does take clopidogrel 75 mg a day for unknown reasons.    Past Medical History:   Diagnosis Date   • CAD (coronary artery disease) 1990     S/P acute MI   • Cancer (CMS-HCC)     skin   • Chronic autoimmune thyroiditis    • Dementia in Alzheimer's disease    • High cholesterol    • Hypothyroidism    • MEDICAL HOME    • Melanoma in situ of  ear (CMS-HCC)    • Myocardial infarct     1990   • Pericarditis    • Personal history of venous thrombosis and embolism 5/2012    stroke   • S/P orchiectomy      R / undescended testicle   • Stroke (CMS-HCC) 5/2012    weakness marquez side (Hx of stroke x2)     Past Surgical History:   Procedure Laterality Date   • CATARACT PHACO WITH IOL  4/21/2015    Performed by Matthew Robles M.D. at SURGERY SUR[facility] ORS   • CATARACT PHACO WITH IOL  4/7/2015    Performed by Matthew Robles M.D. at SURGERY [facility] ORS   • HIP HEMIARTHROPLASTY  7/1/2014    Performed by Devon Horowitz M.D. at SURGERY [facility] ORS   • MASS EXCISION GENERAL  8/21/2013    Performed by Avila Maya M.D. at SURGERY [facility] ORS   • OTHER SURGICAL PROCEDURE  1990's    ear lobe surgery     Family History   Problem Relation Age of Onset   • Genetic Neg Hx    • Other Mother 90     unknown   • Dementia Father    • Hypertension       History   Smoking Status   • Former Smoker   • Packs/day: 1.50   • Years: 30.00   • Quit date: 1/1/2009   Smokeless Tobacco   • Never Used     Allergies   Allergen Reactions   • Ambien [Kdc:Red Dye+Ci Pigment Blue 63+Zolpidem]      Daytime confusion     Outpatient Encounter Prescriptions as of 10/23/2017   Medication Sig Dispense Refill   • mirtazapine (REMERON) 15 MG Tab TAKE 1 TABLET BY MOUTH AT BEDTIME. 30 Tab 3   • Cholecalciferol (VITAMIN D) 2000 UNIT Tab TAKE 1 TABLET BY MOUTH ONCE DAILY. 30 Tab 11   • celecoxib (CELEBREX) 200 MG Cap TAKE (1) CAPSULE BY MOUTH ONCE DAILY. 30 Cap 6   • Memantine HCl ER (NAMENDA XR) 28 MG CAPSULE SR 24 HR Take 1 Cap by mouth every day. 30 Cap 11   • donepezil (ARICEPT) 10 MG tablet TAKE 1 TABLET BY MOUTH ONCE DAILY. 30 Tab 11   • simvastatin (ZOCOR) 20 MG Tab TAKE 1 TABLET BY MOUTH IN THE EVENING. 90 Tab 3   • clopidogrel (PLAVIX) 75 MG Tab TAKE 1 TABLET BY MOUTH ONCE DAILY. 30 Tab 11   • lisinopril (PRINIVIL) 20 MG Tab TAKE 1 TABLET BY MOUTH ONCE DAILY. 30 Tab 11  "  • melatonin 3 MG Tab Take 3 mg by mouth every day. at bedtime for sleep     • Diclofenac Sodium (VOLTAREN) 1 % Gel Apply 1 Inch to skin as directed 4 times a day. 5 Tube 6   • FLUZONE HIGH-DOSE 0.5 ML Suspension Prefilled Syringe injection        No facility-administered encounter medications on file as of 10/23/2017.      ROS Review of Systems   Constitutional: Negative for fever, chills, weight loss and fatigue.   HENT: Negative for congestion, sore throat.    Eyes: No change in vision  Respiratory: Negative for cough, shortness of breath and wheezing.    Cardiovascular: See HPI. No chest pain, pressure, tightness, palpitations. No orthopnea, PND, edema. No syncope or lightheadedness  Gastrointestinal: Negative for  nausea, vomiting, diarrhea   Musculoskeletal: Positive for hip pain influencing QOL  Skin: Negative for rash and itching.   Neurological: Unsteady gait may be a fall risk  Psychiatric/Behavioral: No depression, suicidal ideation  Hematologic: No bleeding tendency or easy bruising     Objective:   /78   Pulse 88   Ht 1.88 m (6' 2\")   Wt 78.5 kg (173 lb)   SpO2 93%   BMI 22.21 kg/m²     Physical Exam   Exam:    Vitals:    10/23/17 1338   BP: 138/78   Pulse: 88   SpO2: 93%   Weight: 78.5 kg (173 lb)   Height: 1.88 m (6' 2\")     Constitutional: Well developed, well nourished male in no acute distress. Appears approximate stated age and provides a reasonable history. He is pleasantly demented  HEENT: Normocephalic, pupils are equal and responsive.No arcus. Conjunctiva and sclera are clear. No xanthelasma. No diagonal ear lobe crease noted. Mucus membranes are moist and pink. Good oral hygiene  Neck: No JVD or HJR. JVP = 4-5cm H2O. Good carotid upstroke with no bruit. No lymphadenopathy, No thyroid enlargement.  Cardiovascular: Regular rate and rhythm, no ectopics. A systolic ejection murmur is audible at the base with little radiation. There is no diastolic murmur. There is an nearly " holosystolic apical murmur grade 2/6  with no associated gallop, rub or click. No lift, heave,  thrill, or cardiomegaly  Lungs: Normal effort, Clear to auscultation and percussion. No rales, rhonchi, wheezing Abdomen: Soft, non tender. No liver, kidney, spleen or mass palpable. The abdominal aorta is palpable, not enlarged. Active bowel sounds are noted and there is no bruit  Extremities:  No cyanosis, edema, clubbing. Palpable posterior tibial and dorsal pedal pulses bilaterally.  Skin:   Warm and dry, no active lesions  Neurologic: Alert & oriented but missing many details in the history. Strength and sensation grossly intact. No lateralizing signs  Psychiatric:  Affect and mood are appropriate    Assessment:     1. Essential hypertension  EKG   2. Coronary artery disease involving native coronary artery of native heart without angina pectoris     3. History of MI (myocardial infarction)     4. Dementia associated with other underlying disease without behavioral disturbance     5. Chronic obstructive pulmonary disease, unspecified COPD type (CMS-HCC)     6. History of CVA (cerebrovascular accident)         Medical Decision Making:  Today's Assessment / Status / Plan:     The patient's blood pressure is well controlled on a fairly simple regimen. He has a history of coronary artery  disease in the remote past but is currently free of symptoms with modest activity. There are no symptoms of congestive heart failure  and his EKG is unremarkable with no acute changes noted. Based on the lack of symptoms of unstable coronary artery disease or uncontrolled congestive heart failure, the patient's cardiovascular risk for the proposed surgery is acceptably low. An echocardiogram would be of interest but not a necessity prior to surgery. However, if it is possible to obtain one while in the hospital it would be interesting in the assessment of his heart murmurs. No matter what the origins of the murmurs are, the patient is  asymptomatic and should do well with surgery.    He has a diagnosis of COPD which may modify his surgical risk but he is not oxygen dependent and appears to be breathing at ease with no significant symptoms at this time. It is possible that he could run into problems postoperatively due to this issue. The same goes with his dementia which is not going influences mortality during surgery but may make recovery more difficult. The history of the CVA is interesting in this may be why he is taking clopidogrel. He will be anticoagulated for a while postoperatively so hopefully this will not present problems either.    Return appointment was not made but I would be glad to see the patient in follow-up if there are any questions regarding his echocardiogram. But again this is of academic interest at this point. Avoiding fluid overload in the perioperative period would be prudent. If the anesthesiologist has any questions regarding the patient's situation please do not hesitate to call.

## 2017-10-27 LAB — EKG IMPRESSION: NORMAL

## 2018-01-01 ENCOUNTER — PATIENT OUTREACH (OUTPATIENT)
Dept: HEALTH INFORMATION MANAGEMENT | Facility: OTHER | Age: 78
End: 2018-01-01

## 2018-01-01 ENCOUNTER — APPOINTMENT (OUTPATIENT)
Dept: RADIOLOGY | Facility: MEDICAL CENTER | Age: 78
DRG: 517 | End: 2018-01-01
Attending: ORTHOPAEDIC SURGERY
Payer: MEDICARE

## 2018-01-01 ENCOUNTER — HOSPITAL ENCOUNTER (INPATIENT)
Facility: MEDICAL CENTER | Age: 78
LOS: 2 days | DRG: 517 | End: 2018-05-26
Attending: ORTHOPAEDIC SURGERY | Admitting: ORTHOPAEDIC SURGERY
Payer: MEDICARE

## 2018-01-01 ENCOUNTER — TELEPHONE (OUTPATIENT)
Dept: MEDICAL GROUP | Facility: MEDICAL CENTER | Age: 78
End: 2018-01-01

## 2018-01-01 VITALS
OXYGEN SATURATION: 91 % | HEIGHT: 72 IN | BODY MASS INDEX: 22.9 KG/M2 | DIASTOLIC BLOOD PRESSURE: 88 MMHG | HEART RATE: 104 BPM | SYSTOLIC BLOOD PRESSURE: 162 MMHG | RESPIRATION RATE: 18 BRPM | TEMPERATURE: 98.7 F | WEIGHT: 169.09 LBS

## 2018-01-01 DIAGNOSIS — F02.80 DEMENTIA ASSOCIATED WITH OTHER UNDERLYING DISEASE WITHOUT BEHAVIORAL DISTURBANCE (HCC): ICD-10-CM

## 2018-01-01 DIAGNOSIS — Z47.1 AFTERCARE FOLLOWING LEFT HIP JOINT REPLACEMENT SURGERY: ICD-10-CM

## 2018-01-01 DIAGNOSIS — Z01.810 PRE-OPERATIVE CARDIOVASCULAR EXAMINATION: ICD-10-CM

## 2018-01-01 DIAGNOSIS — G89.18 POSTOPERATIVE PAIN: ICD-10-CM

## 2018-01-01 DIAGNOSIS — Z96.642 AFTERCARE FOLLOWING LEFT HIP JOINT REPLACEMENT SURGERY: ICD-10-CM

## 2018-01-01 DIAGNOSIS — Z01.812 PRE-OPERATIVE LABORATORY EXAMINATION: ICD-10-CM

## 2018-01-01 LAB
ANION GAP SERPL CALC-SCNC: 3 MMOL/L (ref 0–11.9)
APPEARANCE UR: CLEAR
BASOPHILS # BLD AUTO: 0.5 % (ref 0–1.8)
BASOPHILS # BLD: 0.03 K/UL (ref 0–0.12)
BILIRUB UR QL STRIP.AUTO: NEGATIVE
BUN SERPL-MCNC: 13 MG/DL (ref 8–22)
CALCIUM SERPL-MCNC: 10.3 MG/DL (ref 8.5–10.5)
CHLORIDE SERPL-SCNC: 108 MMOL/L (ref 96–112)
CO2 SERPL-SCNC: 29 MMOL/L (ref 20–33)
COLOR UR: YELLOW
CREAT SERPL-MCNC: 0.62 MG/DL (ref 0.5–1.4)
EKG IMPRESSION: NORMAL
EOSINOPHIL # BLD AUTO: 0.07 K/UL (ref 0–0.51)
EOSINOPHIL NFR BLD: 1.3 % (ref 0–6.9)
ERYTHROCYTE [DISTWIDTH] IN BLOOD BY AUTOMATED COUNT: 46.5 FL (ref 35.9–50)
GLUCOSE SERPL-MCNC: 95 MG/DL (ref 65–99)
GLUCOSE UR STRIP.AUTO-MCNC: NEGATIVE MG/DL
HCT VFR BLD AUTO: 44.2 % (ref 42–52)
HGB BLD-MCNC: 14.9 G/DL (ref 14–18)
HIV 1+2 AB+HIV1 P24 AG SERPL QL IA: NON REACTIVE
IMM GRANULOCYTES # BLD AUTO: 0.02 K/UL (ref 0–0.11)
IMM GRANULOCYTES NFR BLD AUTO: 0.4 % (ref 0–0.9)
KETONES UR STRIP.AUTO-MCNC: NEGATIVE MG/DL
LEUKOCYTE ESTERASE UR QL STRIP.AUTO: NEGATIVE
LYMPHOCYTES # BLD AUTO: 1.22 K/UL (ref 1–4.8)
LYMPHOCYTES NFR BLD: 22.2 % (ref 22–41)
MCH RBC QN AUTO: 31.6 PG (ref 27–33)
MCHC RBC AUTO-ENTMCNC: 33.7 G/DL (ref 33.7–35.3)
MCV RBC AUTO: 93.6 FL (ref 81.4–97.8)
MICRO URNS: NORMAL
MONOCYTES # BLD AUTO: 0.42 K/UL (ref 0–0.85)
MONOCYTES NFR BLD AUTO: 7.6 % (ref 0–13.4)
NEUTROPHILS # BLD AUTO: 3.74 K/UL (ref 1.82–7.42)
NEUTROPHILS NFR BLD: 68 % (ref 44–72)
NITRITE UR QL STRIP.AUTO: NEGATIVE
NRBC # BLD AUTO: 0 K/UL
NRBC BLD-RTO: 0 /100 WBC
PH UR STRIP.AUTO: 6.5 [PH]
PLATELET # BLD AUTO: 275 K/UL (ref 164–446)
PMV BLD AUTO: 10.4 FL (ref 9–12.9)
POTASSIUM SERPL-SCNC: 3.8 MMOL/L (ref 3.6–5.5)
PROT UR QL STRIP: NEGATIVE MG/DL
RBC # BLD AUTO: 4.72 M/UL (ref 4.7–6.1)
RBC UR QL AUTO: NEGATIVE
SCCMEC + MECA PNL NOSE NAA+PROBE: NEGATIVE
SCCMEC + MECA PNL NOSE NAA+PROBE: NEGATIVE
SODIUM SERPL-SCNC: 140 MMOL/L (ref 135–145)
SP GR UR STRIP.AUTO: 1.01
UROBILINOGEN UR STRIP.AUTO-MCNC: 0.2 MG/DL
WBC # BLD AUTO: 5.5 K/UL (ref 4.8–10.8)

## 2018-01-01 PROCEDURE — 93005 ELECTROCARDIOGRAM TRACING: CPT

## 2018-01-01 PROCEDURE — 160031 HCHG SURGERY MINUTES - 1ST 30 MINS LEVEL 5: Performed by: ORTHOPAEDIC SURGERY

## 2018-01-01 PROCEDURE — C1776 JOINT DEVICE (IMPLANTABLE): HCPCS | Performed by: ORTHOPAEDIC SURGERY

## 2018-01-01 PROCEDURE — 770006 HCHG ROOM/CARE - MED/SURG/GYN SEMI*

## 2018-01-01 PROCEDURE — 93010 ELECTROCARDIOGRAM REPORT: CPT | Performed by: INTERNAL MEDICINE

## 2018-01-01 PROCEDURE — 85025 COMPLETE CBC W/AUTO DIFF WBC: CPT

## 2018-01-01 PROCEDURE — 700112 HCHG RX REV CODE 229: Performed by: ORTHOPAEDIC SURGERY

## 2018-01-01 PROCEDURE — 700102 HCHG RX REV CODE 250 W/ 637 OVERRIDE(OP): Performed by: ORTHOPAEDIC SURGERY

## 2018-01-01 PROCEDURE — 700111 HCHG RX REV CODE 636 W/ 250 OVERRIDE (IP): Performed by: ORTHOPAEDIC SURGERY

## 2018-01-01 PROCEDURE — A9270 NON-COVERED ITEM OR SERVICE: HCPCS | Performed by: ORTHOPAEDIC SURGERY

## 2018-01-01 PROCEDURE — 72170 X-RAY EXAM OF PELVIS: CPT

## 2018-01-01 PROCEDURE — 700111 HCHG RX REV CODE 636 W/ 250 OVERRIDE (IP)

## 2018-01-01 PROCEDURE — 160036 HCHG PACU - EA ADDL 30 MINS PHASE I: Performed by: ORTHOPAEDIC SURGERY

## 2018-01-01 PROCEDURE — 700101 HCHG RX REV CODE 250

## 2018-01-01 PROCEDURE — 502000 HCHG MISC OR IMPLANTS RC 0278: Performed by: ORTHOPAEDIC SURGERY

## 2018-01-01 PROCEDURE — 501838 HCHG SUTURE GENERAL: Performed by: ORTHOPAEDIC SURGERY

## 2018-01-01 PROCEDURE — 87640 STAPH A DNA AMP PROBE: CPT

## 2018-01-01 PROCEDURE — 160042 HCHG SURGERY MINUTES - EA ADDL 1 MIN LEVEL 5: Performed by: ORTHOPAEDIC SURGERY

## 2018-01-01 PROCEDURE — 700101 HCHG RX REV CODE 250: Performed by: ORTHOPAEDIC SURGERY

## 2018-01-01 PROCEDURE — 160035 HCHG PACU - 1ST 60 MINS PHASE I: Performed by: ORTHOPAEDIC SURGERY

## 2018-01-01 PROCEDURE — 36415 COLL VENOUS BLD VENIPUNCTURE: CPT

## 2018-01-01 PROCEDURE — 87389 HIV-1 AG W/HIV-1&-2 AB AG IA: CPT

## 2018-01-01 PROCEDURE — 500002 HCHG ADHESIVE, DERMABOND: Performed by: ORTHOPAEDIC SURGERY

## 2018-01-01 PROCEDURE — 80048 BASIC METABOLIC PNL TOTAL CA: CPT

## 2018-01-01 PROCEDURE — 97166 OT EVAL MOD COMPLEX 45 MIN: CPT

## 2018-01-01 PROCEDURE — 502578 HCHG PACK, TOTAL HIP: Performed by: ORTHOPAEDIC SURGERY

## 2018-01-01 PROCEDURE — 500864 HCHG NEEDLE, SPINAL 18G: Performed by: ORTHOPAEDIC SURGERY

## 2018-01-01 PROCEDURE — 160002 HCHG RECOVERY MINUTES (STAT): Performed by: ORTHOPAEDIC SURGERY

## 2018-01-01 PROCEDURE — G8987 SELF CARE CURRENT STATUS: HCPCS | Mod: CK

## 2018-01-01 PROCEDURE — 97162 PT EVAL MOD COMPLEX 30 MIN: CPT

## 2018-01-01 PROCEDURE — A6402 STERILE GAUZE <= 16 SQ IN: HCPCS | Performed by: ORTHOPAEDIC SURGERY

## 2018-01-01 PROCEDURE — 700105 HCHG RX REV CODE 258: Performed by: ORTHOPAEDIC SURGERY

## 2018-01-01 PROCEDURE — 160009 HCHG ANES TIME/MIN: Performed by: ORTHOPAEDIC SURGERY

## 2018-01-01 PROCEDURE — 87641 MR-STAPH DNA AMP PROBE: CPT

## 2018-01-01 PROCEDURE — 0SUA09Z SUPPLEMENT RIGHT HIP JOINT, ACETABULAR SURFACE WITH LINER, OPEN APPROACH: ICD-10-PCS | Performed by: ORTHOPAEDIC SURGERY

## 2018-01-01 PROCEDURE — 700102 HCHG RX REV CODE 250 W/ 637 OVERRIDE(OP)

## 2018-01-01 PROCEDURE — 160048 HCHG OR STATISTICAL LEVEL 1-5: Performed by: ORTHOPAEDIC SURGERY

## 2018-01-01 PROCEDURE — G8979 MOBILITY GOAL STATUS: HCPCS | Mod: CI

## 2018-01-01 PROCEDURE — G8978 MOBILITY CURRENT STATUS: HCPCS | Mod: CJ

## 2018-01-01 PROCEDURE — A4314 CATH W/DRAINAGE 2-WAY LATEX: HCPCS | Performed by: ORTHOPAEDIC SURGERY

## 2018-01-01 PROCEDURE — A9270 NON-COVERED ITEM OR SERVICE: HCPCS

## 2018-01-01 PROCEDURE — 81003 URINALYSIS AUTO W/O SCOPE: CPT

## 2018-01-01 PROCEDURE — G8988 SELF CARE GOAL STATUS: HCPCS | Mod: CI

## 2018-01-01 DEVICE — IMPLANT OXINIUM FEM HD 12/14 28MM +4 (1EA): Type: IMPLANTABLE DEVICE | Site: HIP | Status: FUNCTIONAL

## 2018-01-01 DEVICE — IMPLANTABLE DEVICE: Type: IMPLANTABLE DEVICE | Site: HIP | Status: FUNCTIONAL

## 2018-01-01 RX ORDER — LISINOPRIL 20 MG/1
20 TABLET ORAL DAILY
Qty: 90 TAB | Refills: 3 | Status: SHIPPED | OUTPATIENT
Start: 2018-01-01

## 2018-01-01 RX ORDER — SIMVASTATIN 20 MG
20 TABLET ORAL EVERY EVENING
Qty: 90 TAB | Refills: 3 | Status: SHIPPED | OUTPATIENT
Start: 2018-01-01

## 2018-01-01 RX ORDER — MEMANTINE HYDROCHLORIDE 28 MG/1
28 CAPSULE, EXTENDED RELEASE ORAL DAILY
Qty: 30 CAP | Refills: 11 | Status: SHIPPED | OUTPATIENT
Start: 2018-01-01 | End: 2018-01-01 | Stop reason: SDUPTHER

## 2018-01-01 RX ORDER — DONEPEZIL HYDROCHLORIDE 10 MG/1
TABLET, FILM COATED ORAL
Qty: 30 TAB | Refills: 11 | Status: SHIPPED | OUTPATIENT
Start: 2018-01-01 | End: 2018-01-01 | Stop reason: SDUPTHER

## 2018-01-01 RX ORDER — SODIUM CHLORIDE 9 MG/ML
INJECTION, SOLUTION INTRAVENOUS CONTINUOUS
Status: DISCONTINUED | OUTPATIENT
Start: 2018-01-01 | End: 2018-01-01 | Stop reason: HOSPADM

## 2018-01-01 RX ORDER — SIMVASTATIN 20 MG
20 TABLET ORAL EVERY EVENING
Status: DISCONTINUED | OUTPATIENT
Start: 2018-01-01 | End: 2018-01-01 | Stop reason: HOSPADM

## 2018-01-01 RX ORDER — OXYCODONE HYDROCHLORIDE 5 MG/1
5 TABLET ORAL
Qty: 30 TAB | Refills: 0 | Status: SHIPPED | OUTPATIENT
Start: 2018-01-01 | End: 2018-01-01

## 2018-01-01 RX ORDER — ACETAMINOPHEN 500 MG
1000 TABLET ORAL EVERY 8 HOURS
Status: DISCONTINUED | OUTPATIENT
Start: 2018-01-01 | End: 2018-01-01 | Stop reason: HOSPADM

## 2018-01-01 RX ORDER — CEFAZOLIN SODIUM 2 G/100ML
2 INJECTION, SOLUTION INTRAVENOUS
Status: COMPLETED | OUTPATIENT
Start: 2018-01-01 | End: 2018-01-01

## 2018-01-01 RX ORDER — TAMSULOSIN HYDROCHLORIDE 0.4 MG/1
0.4 CAPSULE ORAL
Status: DISCONTINUED | OUTPATIENT
Start: 2018-01-01 | End: 2018-01-01 | Stop reason: HOSPADM

## 2018-01-01 RX ORDER — MAGNESIUM HYDROXIDE 1200 MG/15ML
LIQUID ORAL
Status: COMPLETED | OUTPATIENT
Start: 2018-01-01 | End: 2018-01-01

## 2018-01-01 RX ORDER — KETOROLAC TROMETHAMINE 30 MG/ML
INJECTION, SOLUTION INTRAMUSCULAR; INTRAVENOUS
Status: COMPLETED
Start: 2018-01-01 | End: 2018-01-01

## 2018-01-01 RX ORDER — OXYCODONE HYDROCHLORIDE 5 MG/1
5 TABLET ORAL
Status: DISCONTINUED | OUTPATIENT
Start: 2018-01-01 | End: 2018-01-01 | Stop reason: HOSPADM

## 2018-01-01 RX ORDER — LIDOCAINE HYDROCHLORIDE 10 MG/ML
INJECTION, SOLUTION EPIDURAL; INFILTRATION; INTRACAUDAL; PERINEURAL
Status: COMPLETED
Start: 2018-01-01 | End: 2018-01-01

## 2018-01-01 RX ORDER — CELECOXIB 200 MG/1
200 CAPSULE ORAL DAILY
Qty: 90 CAP | Refills: 3 | Status: SHIPPED | OUTPATIENT
Start: 2018-01-01

## 2018-01-01 RX ORDER — MIRTAZAPINE 15 MG/1
15 TABLET, FILM COATED ORAL
Status: DISCONTINUED | OUTPATIENT
Start: 2018-01-01 | End: 2018-01-01 | Stop reason: HOSPADM

## 2018-01-01 RX ORDER — DOCUSATE SODIUM 100 MG/1
100 CAPSULE, LIQUID FILLED ORAL 2 TIMES DAILY
Status: DISCONTINUED | OUTPATIENT
Start: 2018-01-01 | End: 2018-01-01 | Stop reason: HOSPADM

## 2018-01-01 RX ORDER — TAMSULOSIN HYDROCHLORIDE 0.4 MG/1
0.4 CAPSULE ORAL
Qty: 90 CAP | Refills: 3 | Status: SHIPPED | OUTPATIENT
Start: 2018-01-01

## 2018-01-01 RX ORDER — LISINOPRIL 20 MG/1
20 TABLET ORAL
Status: DISCONTINUED | OUTPATIENT
Start: 2018-01-01 | End: 2018-01-01 | Stop reason: HOSPADM

## 2018-01-01 RX ORDER — BISACODYL 10 MG
10 SUPPOSITORY, RECTAL RECTAL
Status: DISCONTINUED | OUTPATIENT
Start: 2018-01-01 | End: 2018-01-01 | Stop reason: HOSPADM

## 2018-01-01 RX ORDER — EPINEPHRINE 1 MG/ML
INJECTION INTRAMUSCULAR; INTRAVENOUS; SUBCUTANEOUS
Status: DISCONTINUED | OUTPATIENT
Start: 2018-01-01 | End: 2018-01-01 | Stop reason: HOSPADM

## 2018-01-01 RX ORDER — DONEPEZIL HYDROCHLORIDE 5 MG/1
10 TABLET, FILM COATED ORAL NIGHTLY
Status: DISCONTINUED | OUTPATIENT
Start: 2018-01-01 | End: 2018-01-01 | Stop reason: HOSPADM

## 2018-01-01 RX ORDER — KETOROLAC TROMETHAMINE 30 MG/ML
15 INJECTION, SOLUTION INTRAMUSCULAR; INTRAVENOUS EVERY 6 HOURS
Status: COMPLETED | OUTPATIENT
Start: 2018-01-01 | End: 2018-01-01

## 2018-01-01 RX ORDER — OXYCODONE HYDROCHLORIDE 10 MG/1
10 TABLET ORAL EVERY 6 HOURS PRN
Qty: 40 TAB | Refills: 0 | Status: SHIPPED | OUTPATIENT
Start: 2018-01-01 | End: 2018-01-01

## 2018-01-01 RX ORDER — MIRTAZAPINE 15 MG/1
15 TABLET, FILM COATED ORAL
Qty: 90 TAB | Refills: 3 | Status: SHIPPED | OUTPATIENT
Start: 2018-01-01

## 2018-01-01 RX ORDER — DEXAMETHASONE SODIUM PHOSPHATE 4 MG/ML
4 INJECTION, SOLUTION INTRA-ARTICULAR; INTRALESIONAL; INTRAMUSCULAR; INTRAVENOUS; SOFT TISSUE
Status: DISCONTINUED | OUTPATIENT
Start: 2018-01-01 | End: 2018-01-01 | Stop reason: HOSPADM

## 2018-01-01 RX ORDER — CHLORPROMAZINE HYDROCHLORIDE 10 MG/1
25 TABLET, FILM COATED ORAL EVERY 6 HOURS PRN
Status: DISCONTINUED | OUTPATIENT
Start: 2018-01-01 | End: 2018-01-01 | Stop reason: HOSPADM

## 2018-01-01 RX ORDER — MEMANTINE HYDROCHLORIDE 28 MG/1
28 CAPSULE, EXTENDED RELEASE ORAL DAILY
Qty: 30 CAP | Refills: 11 | Status: SHIPPED | OUTPATIENT
Start: 2018-01-01

## 2018-01-01 RX ORDER — SCOLOPAMINE TRANSDERMAL SYSTEM 1 MG/1
1 PATCH, EXTENDED RELEASE TRANSDERMAL
Status: DISCONTINUED | OUTPATIENT
Start: 2018-01-01 | End: 2018-01-01 | Stop reason: HOSPADM

## 2018-01-01 RX ORDER — ACETAMINOPHEN 500 MG
TABLET ORAL
Status: COMPLETED
Start: 2018-01-01 | End: 2018-01-01

## 2018-01-01 RX ORDER — POLYETHYLENE GLYCOL 3350 17 G/17G
1 POWDER, FOR SOLUTION ORAL 2 TIMES DAILY PRN
Status: DISCONTINUED | OUTPATIENT
Start: 2018-01-01 | End: 2018-01-01 | Stop reason: HOSPADM

## 2018-01-01 RX ORDER — MEMANTINE HYDROCHLORIDE 10 MG/1
10 TABLET ORAL 2 TIMES DAILY
Status: DISCONTINUED | OUTPATIENT
Start: 2018-01-01 | End: 2018-01-01 | Stop reason: HOSPADM

## 2018-01-01 RX ORDER — ONDANSETRON 2 MG/ML
4 INJECTION INTRAMUSCULAR; INTRAVENOUS EVERY 4 HOURS PRN
Status: DISCONTINUED | OUTPATIENT
Start: 2018-01-01 | End: 2018-01-01 | Stop reason: HOSPADM

## 2018-01-01 RX ORDER — CELECOXIB 200 MG/1
200 CAPSULE ORAL
Status: DISCONTINUED | OUTPATIENT
Start: 2018-01-01 | End: 2018-01-01 | Stop reason: HOSPADM

## 2018-01-01 RX ORDER — LANOLIN ALCOHOL/MO/W.PET/CERES
3 CREAM (GRAM) TOPICAL
Status: DISCONTINUED | OUTPATIENT
Start: 2018-01-01 | End: 2018-01-01

## 2018-01-01 RX ORDER — LIDOCAINE HYDROCHLORIDE 10 MG/ML
0.5 INJECTION, SOLUTION INFILTRATION; PERINEURAL
Status: ACTIVE | OUTPATIENT
Start: 2018-01-01 | End: 2018-01-01

## 2018-01-01 RX ORDER — CLOPIDOGREL BISULFATE 75 MG/1
75 TABLET ORAL DAILY
Status: DISCONTINUED | OUTPATIENT
Start: 2018-01-01 | End: 2018-01-01 | Stop reason: HOSPADM

## 2018-01-01 RX ORDER — TRAMADOL HYDROCHLORIDE 50 MG/1
50 TABLET ORAL EVERY 4 HOURS PRN
Status: DISCONTINUED | OUTPATIENT
Start: 2018-01-01 | End: 2018-01-01 | Stop reason: HOSPADM

## 2018-01-01 RX ORDER — GABAPENTIN 300 MG/1
CAPSULE ORAL
Status: COMPLETED
Start: 2018-01-01 | End: 2018-01-01

## 2018-01-01 RX ORDER — CLOPIDOGREL BISULFATE 75 MG/1
75 TABLET ORAL DAILY
Qty: 90 TAB | Refills: 3 | Status: SHIPPED | OUTPATIENT
Start: 2018-01-01

## 2018-01-01 RX ORDER — AMOXICILLIN 250 MG
1 CAPSULE ORAL NIGHTLY
Status: DISCONTINUED | OUTPATIENT
Start: 2018-01-01 | End: 2018-01-01 | Stop reason: HOSPADM

## 2018-01-01 RX ORDER — DONEPEZIL HYDROCHLORIDE 10 MG/1
TABLET, FILM COATED ORAL
Qty: 30 TAB | Refills: 11 | Status: SHIPPED | OUTPATIENT
Start: 2018-01-01

## 2018-01-01 RX ORDER — MORPHINE SULFATE 4 MG/ML
4 INJECTION, SOLUTION INTRAMUSCULAR; INTRAVENOUS
Status: DISCONTINUED | OUTPATIENT
Start: 2018-01-01 | End: 2018-01-01 | Stop reason: HOSPADM

## 2018-01-01 RX ORDER — ENEMA 19; 7 G/133ML; G/133ML
1 ENEMA RECTAL
Status: DISCONTINUED | OUTPATIENT
Start: 2018-01-01 | End: 2018-01-01 | Stop reason: HOSPADM

## 2018-01-01 RX ORDER — ACETAMINOPHEN 650 MG
TABLET, EXTENDED RELEASE ORAL
Status: DISCONTINUED | OUTPATIENT
Start: 2018-01-01 | End: 2018-01-01 | Stop reason: HOSPADM

## 2018-01-01 RX ORDER — OXYCODONE HYDROCHLORIDE 10 MG/1
10 TABLET ORAL
Status: DISCONTINUED | OUTPATIENT
Start: 2018-01-01 | End: 2018-01-01 | Stop reason: HOSPADM

## 2018-01-01 RX ORDER — DIPHENHYDRAMINE HYDROCHLORIDE 50 MG/ML
25 INJECTION INTRAMUSCULAR; INTRAVENOUS EVERY 6 HOURS PRN
Status: DISCONTINUED | OUTPATIENT
Start: 2018-01-01 | End: 2018-01-01 | Stop reason: HOSPADM

## 2018-01-01 RX ORDER — CELECOXIB 200 MG/1
CAPSULE ORAL
Status: COMPLETED
Start: 2018-01-01 | End: 2018-01-01

## 2018-01-01 RX ORDER — DIPHENHYDRAMINE HCL 25 MG
25 TABLET ORAL EVERY 6 HOURS PRN
Status: DISCONTINUED | OUTPATIENT
Start: 2018-01-01 | End: 2018-01-01 | Stop reason: HOSPADM

## 2018-01-01 RX ORDER — HYDRALAZINE HYDROCHLORIDE 20 MG/ML
5 INJECTION INTRAMUSCULAR; INTRAVENOUS EVERY 6 HOURS PRN
Status: DISCONTINUED | OUTPATIENT
Start: 2018-01-01 | End: 2018-01-01 | Stop reason: HOSPADM

## 2018-01-01 RX ORDER — CHLORPROMAZINE HYDROCHLORIDE 25 MG/ML
25 INJECTION INTRAMUSCULAR EVERY 6 HOURS PRN
Status: DISCONTINUED | OUTPATIENT
Start: 2018-01-01 | End: 2018-01-01 | Stop reason: HOSPADM

## 2018-01-01 RX ORDER — SODIUM CHLORIDE, SODIUM LACTATE, POTASSIUM CHLORIDE, CALCIUM CHLORIDE 600; 310; 30; 20 MG/100ML; MG/100ML; MG/100ML; MG/100ML
INJECTION, SOLUTION INTRAVENOUS CONTINUOUS
Status: DISCONTINUED | OUTPATIENT
Start: 2018-01-01 | End: 2018-01-01 | Stop reason: HOSPADM

## 2018-01-01 RX ORDER — AMOXICILLIN 250 MG
1 CAPSULE ORAL
Status: DISCONTINUED | OUTPATIENT
Start: 2018-01-01 | End: 2018-01-01 | Stop reason: HOSPADM

## 2018-01-01 RX ORDER — OXYCODONE HCL 5 MG/5 ML
SOLUTION, ORAL ORAL
Status: COMPLETED
Start: 2018-01-01 | End: 2018-01-01

## 2018-01-01 RX ORDER — HALOPERIDOL 5 MG/ML
1 INJECTION INTRAMUSCULAR EVERY 6 HOURS PRN
Status: DISCONTINUED | OUTPATIENT
Start: 2018-01-01 | End: 2018-01-01 | Stop reason: HOSPADM

## 2018-01-01 RX ADMIN — CEFAZOLIN SODIUM 1 G: 1 INJECTION, SOLUTION INTRAVENOUS at 12:41

## 2018-01-01 RX ADMIN — CLOPIDOGREL 75 MG: 75 TABLET, FILM COATED ORAL at 08:46

## 2018-01-01 RX ADMIN — ACETAMINOPHEN 1000 MG: 500 TABLET ORAL at 13:50

## 2018-01-01 RX ADMIN — ACETAMINOPHEN 1000 MG: 500 TABLET ORAL at 21:46

## 2018-01-01 RX ADMIN — KETOROLAC TROMETHAMINE 15 MG: 30 INJECTION, SOLUTION INTRAMUSCULAR at 18:33

## 2018-01-01 RX ADMIN — ACETAMINOPHEN 500 MG: 500 TABLET, FILM COATED ORAL at 12:00

## 2018-01-01 RX ADMIN — CELECOXIB 200 MG: 200 CAPSULE ORAL at 12:00

## 2018-01-01 RX ADMIN — OXYCODONE HYDROCHLORIDE 5 MG: 5 TABLET ORAL at 01:46

## 2018-01-01 RX ADMIN — CLOPIDOGREL 75 MG: 75 TABLET, FILM COATED ORAL at 10:00

## 2018-01-01 RX ADMIN — MEMANTINE HYDROCHLORIDE 10 MG: 10 TABLET ORAL at 08:46

## 2018-01-01 RX ADMIN — KETOROLAC TROMETHAMINE 15 MG: 30 INJECTION, SOLUTION INTRAMUSCULAR at 01:46

## 2018-01-01 RX ADMIN — LIDOCAINE HYDROCHLORIDE 0.5 ML: 10 INJECTION, SOLUTION EPIDURAL; INFILTRATION; INTRACAUDAL; PERINEURAL at 12:45

## 2018-01-01 RX ADMIN — OXYCODONE HYDROCHLORIDE 10 MG: 5 SOLUTION ORAL at 18:05

## 2018-01-01 RX ADMIN — MIRTAZAPINE 15 MG: 15 TABLET, FILM COATED ORAL at 21:46

## 2018-01-01 RX ADMIN — FENTANYL CITRATE 50 MCG: 50 INJECTION, SOLUTION INTRAMUSCULAR; INTRAVENOUS at 18:00

## 2018-01-01 RX ADMIN — SODIUM CHLORIDE, SODIUM LACTATE, POTASSIUM CHLORIDE, CALCIUM CHLORIDE: 600; 310; 30; 20 INJECTION, SOLUTION INTRAVENOUS at 13:15

## 2018-01-01 RX ADMIN — SODIUM CHLORIDE: 9 INJECTION, SOLUTION INTRAVENOUS at 12:46

## 2018-01-01 RX ADMIN — ACETAMINOPHEN 1000 MG: 500 TABLET ORAL at 06:29

## 2018-01-01 RX ADMIN — KETOROLAC TROMETHAMINE 15 MG: 30 INJECTION, SOLUTION INTRAMUSCULAR at 05:14

## 2018-01-01 RX ADMIN — DOCUSATE SODIUM 100 MG: 100 CAPSULE ORAL at 10:00

## 2018-01-01 RX ADMIN — LIDOCAINE HYDROCHLORIDE 0.5 ML: 10 INJECTION, SOLUTION INFILTRATION; PERINEURAL at 12:45

## 2018-01-01 RX ADMIN — CEFAZOLIN SODIUM 1 G: 1 INJECTION, SOLUTION INTRAVENOUS at 01:46

## 2018-01-01 RX ADMIN — CEFAZOLIN SODIUM 2 G: 2 INJECTION, SOLUTION INTRAVENOUS at 08:52

## 2018-01-01 RX ADMIN — DONEPEZIL HYDROCHLORIDE 10 MG: 5 TABLET, FILM COATED ORAL at 21:46

## 2018-01-01 RX ADMIN — CELECOXIB 200 MG: 200 CAPSULE ORAL at 10:00

## 2018-01-01 RX ADMIN — SIMVASTATIN 20 MG: 20 TABLET, FILM COATED ORAL at 21:46

## 2018-01-01 RX ADMIN — TRANEXAMIC ACID 1000 MG: 100 INJECTION, SOLUTION INTRAVENOUS at 18:44

## 2018-01-01 RX ADMIN — DOCUSATE SODIUM 100 MG: 100 CAPSULE ORAL at 08:46

## 2018-01-01 RX ADMIN — KETOROLAC TROMETHAMINE 15 MG: 30 INJECTION, SOLUTION INTRAMUSCULAR at 12:47

## 2018-01-01 RX ADMIN — OXYCODONE HYDROCHLORIDE 5 MG: 5 TABLET ORAL at 21:46

## 2018-01-01 RX ADMIN — FENTANYL CITRATE 50 MCG: 50 INJECTION, SOLUTION INTRAMUSCULAR; INTRAVENOUS at 18:18

## 2018-01-01 RX ADMIN — TAMSULOSIN HYDROCHLORIDE 0.4 MG: 0.4 CAPSULE ORAL at 09:59

## 2018-01-01 RX ADMIN — ACETAMINOPHEN 1000 MG: 500 TABLET ORAL at 05:15

## 2018-01-01 RX ADMIN — MEMANTINE HYDROCHLORIDE 10 MG: 10 TABLET ORAL at 10:00

## 2018-01-01 RX ADMIN — LISINOPRIL 20 MG: 20 TABLET ORAL at 09:59

## 2018-01-01 RX ADMIN — GABAPENTIN 600 MG: 300 CAPSULE ORAL at 12:11

## 2018-01-01 RX ADMIN — TAMSULOSIN HYDROCHLORIDE 0.4 MG: 0.4 CAPSULE ORAL at 08:46

## 2018-01-01 RX ADMIN — LISINOPRIL 20 MG: 20 TABLET ORAL at 08:46

## 2018-01-01 RX ADMIN — SODIUM CHLORIDE: 9 INJECTION, SOLUTION INTRAVENOUS at 21:47

## 2018-01-01 RX ADMIN — STANDARDIZED SENNA CONCENTRATE AND DOCUSATE SODIUM 1 TABLET: 8.6; 5 TABLET, FILM COATED ORAL at 21:46

## 2018-01-01 RX ADMIN — DOCUSATE SODIUM 100 MG: 100 CAPSULE ORAL at 21:46

## 2018-01-01 ASSESSMENT — COGNITIVE AND FUNCTIONAL STATUS - GENERAL
MOBILITY SCORE: 16
MOVING TO AND FROM BED TO CHAIR: UNABLE
DRESSING REGULAR LOWER BODY CLOTHING: A LOT
TOILETING: A LITTLE
MOVING FROM LYING ON BACK TO SITTING ON SIDE OF FLAT BED: A LITTLE
HELP NEEDED FOR BATHING: A LOT
PERSONAL GROOMING: A LITTLE
SUGGESTED CMS G CODE MODIFIER MOBILITY: CK
DAILY ACTIVITIY SCORE: 18
SUGGESTED CMS G CODE MODIFIER DAILY ACTIVITY: CK
STANDING UP FROM CHAIR USING ARMS: A LITTLE
CLIMB 3 TO 5 STEPS WITH RAILING: A LITTLE
TURNING FROM BACK TO SIDE WHILE IN FLAT BAD: A LITTLE
WALKING IN HOSPITAL ROOM: A LITTLE

## 2018-01-01 ASSESSMENT — ACTIVITIES OF DAILY LIVING (ADL): TOILETING: INDEPENDENT

## 2018-01-01 ASSESSMENT — PATIENT HEALTH QUESTIONNAIRE - PHQ9
1. LITTLE INTEREST OR PLEASURE IN DOING THINGS: NOT AT ALL
SUM OF ALL RESPONSES TO PHQ9 QUESTIONS 1 AND 2: 0
SUM OF ALL RESPONSES TO PHQ9 QUESTIONS 1 AND 2: 0
2. FEELING DOWN, DEPRESSED, IRRITABLE, OR HOPELESS: NOT AT ALL
1. LITTLE INTEREST OR PLEASURE IN DOING THINGS: NOT AT ALL
2. FEELING DOWN, DEPRESSED, IRRITABLE, OR HOPELESS: NOT AT ALL

## 2018-01-01 ASSESSMENT — GAIT ASSESSMENTS
ASSISTIVE DEVICE: FRONT WHEEL WALKER
DISTANCE (FEET): 200
GAIT LEVEL OF ASSIST: CONTACT GUARD ASSIST
GAIT LEVEL OF ASSIST: CONTACT GUARD ASSIST
DEVIATION: DECREASED BASE OF SUPPORT;STEP TO;DECREASED HEEL STRIKE;DECREASED TOE OFF
DISTANCE (FEET): 200
DEVIATION: DECREASED BASE OF SUPPORT;DECREASED HEEL STRIKE;DECREASED TOE OFF
ASSISTIVE DEVICE: FRONT WHEEL WALKER

## 2018-01-01 ASSESSMENT — PAIN SCALES - GENERAL
PAINLEVEL_OUTOF10: 3
PAINLEVEL_OUTOF10: 2
PAINLEVEL_OUTOF10: 3
PAINLEVEL_OUTOF10: 1
PAINLEVEL_OUTOF10: 5
PAINLEVEL_OUTOF10: 0
PAINLEVEL_OUTOF10: 2
PAINLEVEL_OUTOF10: 0
PAINLEVEL_OUTOF10: 2
PAINLEVEL_OUTOF10: 3
PAINLEVEL_OUTOF10: 0
PAINLEVEL_OUTOF10: 2
PAINLEVEL_OUTOF10: 0
PAINLEVEL_OUTOF10: 3
PAINLEVEL_OUTOF10: 5
PAINLEVEL_OUTOF10: 1

## 2018-01-01 ASSESSMENT — COPD QUESTIONNAIRES
IN THE PAST 12 MONTHS DO YOU DO LESS THAN YOU USED TO BECAUSE OF YOUR BREATHING PROBLEMS: DISAGREE/UNSURE
DO YOU EVER COUGH UP ANY MUCUS OR PHLEGM?: NO/ONLY WITH OCCASIONAL COLDS OR INFECTIONS
DURING THE PAST 4 WEEKS HOW MUCH DID YOU FEEL SHORT OF BREATH: NONE/LITTLE OF THE TIME
HAVE YOU SMOKED AT LEAST 100 CIGARETTES IN YOUR ENTIRE LIFE: NO/DON'T KNOW
COPD SCREENING SCORE: 2

## 2018-01-01 ASSESSMENT — LIFESTYLE VARIABLES
ALCOHOL_USE: NO
EVER_SMOKED: YES

## 2018-01-29 ENCOUNTER — OFFICE VISIT (OUTPATIENT)
Dept: MEDICAL GROUP | Facility: MEDICAL CENTER | Age: 78
End: 2018-01-29
Payer: MEDICARE

## 2018-01-29 VITALS
HEART RATE: 91 BPM | DIASTOLIC BLOOD PRESSURE: 66 MMHG | HEIGHT: 74 IN | WEIGHT: 173.6 LBS | SYSTOLIC BLOOD PRESSURE: 126 MMHG | OXYGEN SATURATION: 96 % | RESPIRATION RATE: 16 BRPM | TEMPERATURE: 97.9 F | BODY MASS INDEX: 22.28 KG/M2

## 2018-01-29 DIAGNOSIS — M25.559 ARTHRALGIA OF HIP, UNSPECIFIED LATERALITY: ICD-10-CM

## 2018-01-29 DIAGNOSIS — Z01.818 PRE-OP EVALUATION: ICD-10-CM

## 2018-01-29 PROCEDURE — 99213 OFFICE O/P EST LOW 20 MIN: CPT | Performed by: INTERNAL MEDICINE

## 2018-01-29 NOTE — PROGRESS NOTES
CC: Preop evaluation hip pain    HPI:   Kailash presents today with the following.    1. Pre-op evaluation/ Arthralgia of hip, unspecified laterality/ Dementia  Patient seeing orthopedist as already been cleared for hip surgery several months ago. No changes to his condition he does have mild underlying COPD but certainly not severe. He was also cleared by cardiology. He denies any chest pain or shortness breath no edema. Biggest issue is his dementia and he is in an independent living facility.             Patient Active Problem List    Diagnosis Date Noted   • Benign prostatic hyperplasia with lower urinary tract symptoms 06/08/2017   • Chronic obstructive pulmonary disease (CMS-[facility]) 06/08/2017   • Hemiparesis affecting right side as late effect of stroke (CMS-HCC) 06/08/2017   • Dementia associated with other underlying disease without behavioral disturbance 12/06/2016   • Risk for falls 05/05/2015   • Primary insomnia 04/14/2015   • Senile cataract 04/07/2015   • History of MI (myocardial infarction) 09/03/2014   • History of CVA (cerebrovascular accident) 12/27/2013   • Essential hypertension 05/28/2012   • Pulmonary hypertension 05/28/2012   • CAD (coronary artery disease)        Current Outpatient Prescriptions   Medication Sig Dispense Refill   • celecoxib (CELEBREX) 200 MG Cap TAKE (1) CAPSULE BY MOUTH ONCE DAILY. 30 Cap 6   • mirtazapine (REMERON) 15 MG Tab TAKE 1 TABLET BY MOUTH AT BEDTIME. 30 Tab 6   • FLUZONE HIGH-DOSE 0.5 ML Suspension Prefilled Syringe injection      • Cholecalciferol (VITAMIN D) 2000 UNIT Tab TAKE 1 TABLET BY MOUTH ONCE DAILY. 30 Tab 11   • Memantine HCl ER (NAMENDA XR) 28 MG CAPSULE SR 24 HR Take 1 Cap by mouth every day. 30 Cap 11   • donepezil (ARICEPT) 10 MG tablet TAKE 1 TABLET BY MOUTH ONCE DAILY. 30 Tab 11   • simvastatin (ZOCOR) 20 MG Tab TAKE 1 TABLET BY MOUTH IN THE EVENING. 90 Tab 3   • clopidogrel (PLAVIX) 75 MG Tab TAKE 1 TABLET BY MOUTH ONCE DAILY. 30 Tab 11   •  "lisinopril (PRINIVIL) 20 MG Tab TAKE 1 TABLET BY MOUTH ONCE DAILY. 30 Tab 11   • melatonin 3 MG Tab Take 3 mg by mouth every day. at bedtime for sleep     • Diclofenac Sodium (VOLTAREN) 1 % Gel Apply 1 Inch to skin as directed 4 times a day. 5 Tube 6     No current facility-administered medications for this visit.          Allergies as of 01/29/2018 - Reviewed 01/29/2018   Allergen Reaction Noted   • Ambien [kdc:red dye+ci pigment blue 63+zolpidem]  12/04/2014        ROS: As per HPI.    /66   Pulse 91   Temp 36.6 °C (97.9 °F)   Resp 16   Ht 1.88 m (6' 2\")   Wt 78.7 kg (173 lb 9.6 oz)   SpO2 96%   BMI 22.29 kg/m²     Physical Exam:  Gen:         Alert and oriented, No apparent distress.  Neck:        No Lymphadenopathy or Bruits.  Lungs:     Clear to auscultation bilaterally  CV:          Regular rate and rhythm. No murmurs, rubs or gallops.               Ext:          No clubbing, cyanosis, edema.      Assessment and Plan.   77 y.o. male with the following issues.    1. Pre-op evaluation/ Arthralgia of hip, unspecified laterality/Dementia  Patient is released for surgery without only concern being his postoperative stay. He should be admitted to a rehabilitation facility to ensure that he is completely back to baseline in terms of his thought process postanesthesia as well as ambulatory enough to take care of himself. Again otherwise he is released for procedure with only slightly higher than average risk for COPD.      "

## 2018-03-28 RX ORDER — LANOLIN ALCOHOL/MO/W.PET/CERES
CREAM (GRAM) TOPICAL
Qty: 60 TAB | Refills: 11 | Status: SHIPPED | OUTPATIENT
Start: 2018-03-28 | End: 2018-01-01

## 2018-04-10 ENCOUNTER — HOSPITAL ENCOUNTER (OUTPATIENT)
Dept: LAB | Facility: MEDICAL CENTER | Age: 78
End: 2018-04-10
Attending: ORTHOPAEDIC SURGERY
Payer: MEDICARE

## 2018-04-10 LAB
CRP SERPL HS-MCNC: 0.03 MG/DL (ref 0–0.75)
ERYTHROCYTE [SEDIMENTATION RATE] IN BLOOD BY WESTERGREN METHOD: 0 MM/HOUR (ref 0–20)

## 2018-04-10 PROCEDURE — 36415 COLL VENOUS BLD VENIPUNCTURE: CPT

## 2018-04-10 PROCEDURE — 85652 RBC SED RATE AUTOMATED: CPT

## 2018-04-10 PROCEDURE — 86140 C-REACTIVE PROTEIN: CPT

## 2018-04-29 RX ORDER — SIMVASTATIN 20 MG
20 TABLET ORAL EVERY EVENING
Qty: 90 TAB | Refills: 1 | Status: SHIPPED | OUTPATIENT
Start: 2018-04-29 | End: 2018-01-01 | Stop reason: SDUPTHER

## 2018-05-10 NOTE — DISCHARGE PLANNING
DISCHARGE PLANNING NOTE - TOTAL JOINT     Procedure: Procedure(s):  HIP ARTHROPLASTY TOTAL- CONVERSION    Procedure Date: 5/24/2018  Insurance:  Payor: S[employer]IOR [employer]US / Plan: [employer]US   Equipment currently available at home? four-wheel walker  Steps into the home? 0  Steps within the home? Elevator to 2nd floor apartment  Toilet height? Standard  Type of shower? walk-in shower  Who will be with you during your recovery? Lives at the [facility], no friends or family  Is Outpatient Physical Therapy set up after surgery? No   Did you take the Total Joint Class and where? No     Plan: Patient came to his pre-admission appointment with Zohreh Schaffer RN-Geriatric Care Manager ([phone]). She will accompany him to the surgery. At this time he is oriented and signing his own paperwork. There is minimal assistance at the [facility] for toiletting and assistance. Per his PCP, Dr. DONI Wang's note, he is concerned that the patient's thought process returns to baseline prior to returning home and recommends a SNF discharge. Discussed this option with the patient and he was agreeable if it was determined that he needed continued therapy. Provided a SNF choice form. Reviewed Equipment Resource list and provided a copy to the patient. Recommended a raised toilet seat and shower chair. He will need a FWW and HH if he goes directly home. Discharge is undetermined.

## 2018-05-14 NOTE — PROGRESS NOTES
1. Attempt #: 1- Did not speak directly to patient, spoke to patient's caretaker Zohreh.    2. HealthConnect Verified: yes    3. Verify PCP: yes    4. Care Team Updated:       •   DME Company (gait device, O2, CPAP, etc.): N\A       •   Other Specialists (eye doctor, derm, GYN, cardiology, endo, etc): N\A    5.  Reviewed/Updated the following with patient:       •   Communication Preference Obtained? NO       •   Preferred Pharmacy? NO       •   Preferred Lab? NO       •   Family History (document living status of immediate family members and if + hx of cancer, diabetes, hypertension, hyperlipidemia, heart attack, stroke) NO    6. [employer] Activation: already active    7. [employer] Adria: no    8. Annual Wellness Visit Scheduling  Scheduling Status:Scheduled      9. Care Gap Scheduling (Attempt to Schedule EACH Overdue Care Gap!)     Health Maintenance Due   Topic Date Due   • PFT SCREENING-FEV1 AND FEV/FVC RATIO / SPIROMETRY SHOULD BE PERFORMED ANNUALLY  05/15/1958        Scheduled patient for Annual Wellness Visit    10. Patient was advised: “This is a free wellness visit. The provider will screen for medical conditions to help you stay healthy. If you have other concerns to address you may be asked to discuss these at a separate visit or there may be an additional fee.”     11. Patient was informed to arrive 15 min prior to their scheduled appointment and bring in their medication bottles.

## 2018-05-24 NOTE — PROGRESS NOTES
The Medication Reconciliation process has been completed by interviewing the patient    Allergies have been reviewed  Antibiotic use in 30 days - none    Home Pharmacy:  [facility]

## 2018-05-25 NOTE — OP REPORT
DATE OF SERVICE:  05/24/2018    INDICATIONS:  Patient with hemiarthroplasty and still with pain in the groin   area, degeneration of the acetabulum.    PREOPERATIVE DIAGNOSIS:  Failed hemiarthroplasty.    POSTOPERATIVE DIAGNOSIS:  Failed hemiarthroplasty.    PROCEDURE:  Left conversion to total hip arthroplasty.    ANESTHESIA:  General.    COMPLICATIONS:  None.    SURGEON:  Luca Brewster MD    ASSISTANT:  Cosmo Chavez MD.    DESCRIPTION OF PROCEDURE:  Patient was identified in the preoperative area.    Site was marked, taken back to the operating room and underwent general   anesthesia.  The left lower extremity was prepped and draped in the sterile   manner.  Preoperative timeout was held.  Antibiotics were given.  The old   incision was excised and soft tissue dissected down to fascia.  Fascia was   split with the IT band and gluteus fascia, the capsule was taken off of the   greater trochanter and then the hip was dislocated.  After scar was removed,   the femoral ball was taken off and then the acetabulum was exposed.  This was   reamed up to a size 59 and a 60 Grace Titanium cup was placed and screws   were placed up into the dome and then a liner for an MDM.  Final trialing   showed equal leg length stability with a +4 head, a +4, 28 head was placed   into the 48 MDM poly; this was placed on to the trocar and then reduced, found   to be extremely stable.  The wound was soaked with dilute Betadine solution   and is injected with Marcaine.  A 5-0 Tycron was used to close the capsule.    Vicryl was used for the fascia, Monocryl for soft tissue, skin and Dermabond   for the final skin layer.  Patient awoken up, taken back to PACU, will   weightbear as tolerated.  Posterior hip precautions.       ____________________________________     Luca Brewster MD    JJ / NTS    DD:  05/24/2018 17:54:38  DT:  05/24/2018 19:36:43    D#:  [id number]  Job#:  [id number]

## 2018-05-25 NOTE — CARE PLAN
Problem: Pain Management  Goal: Pain level will decrease to patient's comfort goal    Intervention: Follow pain managment plan developed in collaboration with patient and Interdisciplinary Team  Pt complains of pain to the LLE which is well controlled with PRN oxycodone.

## 2018-05-25 NOTE — OR NURSING
Pt reports minimal pain. VSS, tolerated clear liquids. nati c/d/rhonda BAEZ. AxOx#. Got the year wrong, but knew the president. Belongings on bed.

## 2018-05-25 NOTE — DISCHARGE PLANNING
Anticipated Discharge Disposition: SNF    Action: Called [facility] to follow up on pt's referral. Spoke with Xu who stated referral wasn't received. SW re-faxed referral    Barriers to Discharge: Pending SNF acceptance    Plan: Follow up with [facility] regarding acceptance to facility     Update: Faxed OT note to [facility]. Called and left VM for Xu requesting call back

## 2018-05-25 NOTE — THERAPY
"Occupational Therapy Evaluation completed.   Functional Status:  Pt rolling in bed upon OT's arrival.  Pt thoroughly educated on posterior hip precautions prior to activity.  SBA supine to sit.  Mod A to don pants seated EOB with max prompts to follow hip precautions.  Pt impulsive and frequently breaking hip precautions (i.e., lifting leg up to chest when sitting EOB).  Pt stood and walked hallway with CGA.  Min A to safety sit in chair.  Pt appears to have very poor short term memory and poor insight into deficits, impacting his ability to follow and remember posterior hip precautions, and for general safety at home.  Plan of Care: Will benefit from Occupational Therapy 3 times per week  Discharge Recommendations:  Equipment: Will Continue to Assess for Equipment Needs. Pt will benefit from further therapy at SNF/rehab facility prior to DC home.    See \"Rehab Therapy-Acute\" Patient Summary Report for complete documentation.    "

## 2018-05-25 NOTE — DISCHARGE PLANNING
TCN met with patient at bedside to discuss the care teams recommendation for SNF. Patient is agreeable to suggestion and requested choice be faxed to [facility] and [facility]. Choice faxed to [facility].

## 2018-05-25 NOTE — DISCHARGE PLANNING
Agency/Facility Name: [facility]  Spoke To: Eliza  Outcome: declined no skilled need.

## 2018-05-25 NOTE — DISCHARGE SUMMARY
Patient was admitted for a Total Hip Arthroplasty.  Had no complications during the surgery. Did well post-operatively.               There are no active hospital problems to display for this patient.      Uneventful hospital course.     Medication List      START taking these medications      Instructions   oxyCODONE immediate-release 5 MG Tabs  Commonly known as:  ROXICODONE   Take 1 Tab by mouth every 3 hours as needed for up to 7 days.  Dose:  5 mg        ASK your doctor about these medications      Instructions   celecoxib 200 MG Caps  Commonly known as:  CELEBREX   TAKE (1) CAPSULE BY MOUTH ONCE DAILY.     clopidogrel 75 MG Tabs  Commonly known as:  PLAVIX   TAKE 1 TABLET BY MOUTH ONCE DAILY.     Diclofenac Sodium 1 % Gel  Commonly known as:  VOLTAREN   Apply 1 Inch to skin as directed 4 times a day.  Dose:  1 Inch     donepezil 10 MG tablet  Commonly known as:  ARICEPT   TAKE 1 TABLET BY MOUTH ONCE DAILY.     lisinopril 20 MG Tabs  Commonly known as:  PRINIVIL   TAKE 1 TABLET BY MOUTH ONCE DAILY.     melatonin 3 MG Tabs   Take 3 mg by mouth every bedtime.  Dose:  3 mg     Memantine HCl ER 28 MG Cp24  Commonly known as:  NAMENDA XR   Take 1 Cap by mouth every day.  Dose:  28 mg     mirtazapine 15 MG Tabs  Commonly known as:  REMERON   TAKE 1 TABLET BY MOUTH AT BEDTIME.     simvastatin 20 MG Tabs  Commonly known as:  ZOCOR   Take 1 Tab by mouth every evening.  Dose:  20 mg     vitamin D 2000 UNIT Tabs   TAKE 1 TABLET BY MOUTH ONCE DAILY.            Patient will be discharged home and follow up with Dr. Brewster clinic in 2 weeks, for which the patient already has scheduled.

## 2018-05-25 NOTE — DISCHARGE PLANNING
Received Choice form at 1150  Agency/Facility Name:   Referral sent to [facility] per Choice form @ 9372.   We will need a SNF order.

## 2018-05-25 NOTE — THERAPY
"Physical Therapy Evaluation completed.   Bed Mobility: Minimal Assist (for L LE)    Transfers: Contact Guard Assist   Gait: x200' at [facility] with Front-Wheel Walker       Plan of Care: Will benefit from Physical Therapy 4 times per week  Discharge Recommendations: Equipment: Will Continue to Assess for Equipment Needs. Post-acute therapy Discharge to a transitional care facility for continued skilled therapy services.    Pt presents with mild pain to L hip/incision site and demonstrates instability in standing and during gait. Gait pattern was step-to with excessive external rotation to bilateral LE, but L LE especially. Pt was able to correct with cueing to maintain body inside FWW. During stair assessment, pt required cues for sequencing as well as for safety. He possibly presents with minor cognitive impairment as he required repeated cues for safety and appeared somewhat impulsive. He also gave an inconsistent history. While here, pt will benefit from further acute PT intervention to progress his mobility and safety. As he is a fall risk, he could benefit from placement to improve his functional mobility.     See \"Rehab Therapy-Acute\" Patient Summary Report for complete documentation.     "

## 2018-05-25 NOTE — PROGRESS NOTES
Pt arrived to unit at 2050 via Pt transport. No family present. Two RN skin check done with Zohreh ALVAREZ. Skin is unremarkable with the exception of a surgical incision to the left hip. Dressing is CDI. No other signs of skin breakdown noted.

## 2018-05-25 NOTE — PROGRESS NOTES
Patient seen and examined  No complaints    Blood pressure 117/69, pulse 94, temperature 36.4 °C (97.5 °F), resp. rate 18, height 1.829 m (6'), weight 76.7 kg (169 lb 1.5 oz), SpO2 95 %.          No acute distress  Dressing clean dry and intact  Neurovascularly intact      Plan:  Doing well  Discharge home.

## 2018-05-25 NOTE — CARE PLAN
Problem: Safety  Goal: Will remain free from injury  Pt remains free from injury, Floor clear from clutter and cords.  Pt A+OX3, Non-Skid yellow socks in place, Bed/chair alarm on. Proper signs outside pt door in place. Door remains open.  Pt uses call light appropriately. Call light with in reach of pt.  Hourly rounding in place.    Problem: Venous Thromboembolism (VTW)/Deep Vein Thrombosis (DVT) Prevention:  Goal: Patient will participate in Venous Thrombosis (VTE)/Deep Vein Thrombosis (DVT)Prevention Measures  Outcome: PROGRESSING AS EXPECTED   05/25/18 0738   Mechanical/VTE Prophylaxis   Mechanical Prophylaxis  SCDs, Sequential Compression Device   SCDs, Sequential Compression Device On   OTHER   Risk Assessment Score 3   VTE RISK High       Problem: Mobility  Goal: Risk for activity intolerance will decrease  Outcome: PROGRESSING AS EXPECTED  Pt compliant ambulating with one assist and walker.  OOB for breakfast and lunch.

## 2018-05-25 NOTE — DISCHARGE PLANNING
Anticipated Discharge Disposition: [facility]    Action: Received call back from Xu indicating pt is accepted, as long as pt has d/c plan and clarification if [place].     Called Bernie Avila who stated she only has guardianship over pt's estate. Bernie stated pt lives at [facility] and the plan is for pt to return home upon discharge. Bernie requested she be called when pt is transferred. After hours line is [phone].     Called [facility] to update. Xu stated they can accept pt 5/26, however [facility] van will have to provide transportation. [id number]    Barriers to Discharge: None    Plan: Transfer to [facility] 5/26

## 2018-05-25 NOTE — RESPIRATORY CARE
COPD EDUCATION by COPD CLINICAL EDUCATOR  5/25/2018 at 8:57 AM by Heather Harrison     Patient reviewed by COPD education team. Patient does not qualify for COPD program.

## 2018-05-26 PROBLEM — G89.18 POSTOPERATIVE PAIN: Status: ACTIVE | Noted: 2018-01-01

## 2018-05-26 NOTE — CARE PLAN
Problem: Bowel/Gastric:  Goal: Will not experience complications related to bowel motility    Intervention: Assess baseline bowel pattern  BM 5/25/18, no nausea, tolerating meals      Problem: Mobility  Goal: Risk for activity intolerance will decrease    Intervention: Assess and monitor signs of activity intolerance  Ambulated up in cruz and to bathroom with walker.

## 2018-05-26 NOTE — PROGRESS NOTES
Patient has been noncompliant with care on this shift. All PM medication was refused despite several attempts from nursing staff. PRN medication was ordered for elevated BP, however, patient refused. Patient is confused and agitated at times and attempts get out of bed throughout the night. Bed alarm is on and functioning and call light is within reach. Nursing will continue to monitor for changes.

## 2018-05-26 NOTE — DISCHARGE PLANNING
Anticipated Discharge Disposition: [facility]    Action: Pt scheduled to d/c today at 1400 to [place]. BSN aware addended d/c summary is needed prior to transfer.     Barriers to Discharge: Updated d/c summary    Plan: Transfer to SNF    COBRA packet printed @ 1640. Placed in pt's chart. Called pt's guardian to inform of transfer.

## 2018-05-26 NOTE — DISCHARGE SUMMARY
DISCHARGE SUMMARY    PATIENTS NAME: Kailash Haile    MRN: [id number]  CSN: [id number]    ADMIT DATE:  5/24/2018  ADMIT MD: Luca Brewster M.D.    DISCHARGE DATE: 5/26/2018  DISCHARGE DIAGNOSIS:S/P left conversion to total hip arthroplasty  DISCHARGE MD: Luca Brewster M.D.    REASON FOR ADMISSION:failed left hemiarthroplasty    PRINCIPAL DIAGNOSIS:failed left hemiarthroplasty    SECONDARY DIAGNOSIS:none    PROCEDURES: Conversion to total left hi arthroplasty on 5/24/2018 by Luca Brewster M.D.     CONSULTATIONS: none     HOSPITAL COURSE: Patient is a 78 year old male seen in clinic for persistent postoperative pain succeeding a hemiarthroplasty He consulted Dr Brewster was consulted for Orthopaedics, who felt that the nature of the patient's condition warranted surgical re-intervention.  After explaining the indications, risks, benefits, and alternatives the patient wished to proceed with surgery. The patient was taken to the OR for the above mentioned procedure.  There were no complications and minimal blood loss. Kailash Haile has done well with mobilization and pain has been well controlled with oral medications. Wound care instructions were given, patient's questions answered, and Kailash Haile is ready for discharge to [facility] at this time.     DISCHARGE LOCATION: [place], [place]    DVT PROPHYLAXIS:Plavix  ANTIBIOTICS:complete  MEDICATIONS:   Current Outpatient Prescriptions   Medication Sig Dispense Refill   • oxyCODONE immediate-release (ROXICODONE) 5 MG Tab Take 1 Tab by mouth every 3 hours as needed for up to 7 days. 30 Tab 0     WEIGHT BEARING STATUS:as tolerated with assist    FOLLOW UP: 10-14 days post operatively with Dr Luca Brewster M.D.

## 2018-05-26 NOTE — CARE PLAN
Problem: Communication  Goal: The ability to communicate needs accurately and effectively will improve  Outcome: PROGRESSING AS EXPECTED      Problem: Safety  Goal: Will remain free from falls  Outcome: PROGRESSING AS EXPECTED      Problem: Infection  Goal: Will remain free from infection  Outcome: PROGRESSING AS EXPECTED      Problem: Bowel/Gastric:  Goal: Normal bowel function is maintained or improved  Outcome: PROGRESSING AS EXPECTED

## 2018-05-26 NOTE — DISCHARGE PLANNING
Per LUANNE Samson, transport has been arranged for patient to transfer to [facility]  05-26-18 at 1400 via the [facility] van. Message left for STEFFEN Samson at 1841 on 05-25-18.

## 2018-05-26 NOTE — DISCHARGE INSTRUCTIONS
*Follow up with Dr. Brewster at scheduled appointment  *Weight bearing as tolerated                   *Activity as tolerated  *Use assistive device for all activity  *Continue exercises provided by physical therapy  *Elevate leg as needed  *Ice as needed (20 minutes every 1-2 hours)  *Keep dressing in place until 05/24/18 postoperative day #5   *Starting 05/24/18 remove dressing and shower. Do not soak or scrub incision, after shower pat dry and leave open to air.  *No soaking of the incision; no baths, hot tubs, or swimming until cleared by doctor  * 81 mg twice a day for blood clot prevention        *Take medications as prescribed by doctor  *Call doctor’s office with any questions or concerns       Discharge Instructions    Discharged to other by medical transportation with escort. Discharged via wheelchair, hospital escort: Yes.  Special equipment needed: Wheelchair    Be sure to schedule a follow-up appointment with your primary care doctor or any specialists as instructed.     Discharge Plan:   Influenza Vaccine Indication: Not indicated: Previously immunized this influenza season and > 8 years of age    I understand that a diet low in cholesterol, fat, and sodium is recommended for good health. Unless I have been given specific instructions below for another diet, I accept this instruction as my diet prescription.   Other diet: As tolerated    Special Instructions: Discharge instructions for the Orthopedic Patient    Follow up with Primary Care Physician within 2 weeks of discharge to home, regarding:  Review of medications and diagnostic testing.  Surveillance for medical complications.  Workup and treatment of osteoporosis, if appropriate.     -Is this a Joint Replacement patient? Yes   Total Joint Hip Replacement Discharge Instructions    Pain  - The goal is to slowly wean off the prescription pain medicine.  - Ice can be used for pain control.  20 minutes at a time is recommended, and never directly against  your skin or incision.  - Most patients are off the pain pills by 3 weeks; others may require a low level of pain medications for many months. If your pain continues to be severe, follow up with your physician.  Infection  Deep hip joint infections that require removal of the prostheses occur in less than 0.1% of patients. Lesser infections in the skin (cellulites) are more common and much more easily treated.  - Keep the incision as clean and dry as possible.  - Always wash your hands before touching your incision.  - Skin infections tend to develop around 7-10 days after surgery, most can be treated with oral antibiotics.  - Dental Care should be delayed for 3 months after surgery, your surgeon recommends taking a dose of antibiotics 1 hour prior to any dental procedure.  After 2 years, most surgeons recommend antibiotics only before an extensive procedure.  Ask your surgeon what he recommends.  - Signs and symptoms of infection can include:  low grade fever, redness, pain, swelling and drainage from your incision.  Notify your surgeon immediately if you develop any of these symptoms.  Post op Disturbances  - Bowel habits - constipation is extremely common and is caused by a combination of anesthesia, lack of mobility and pain medicine.  Use stool softeners or laxatives if necessary. It is important not to ignore this problem, as bowel obstructions can be a serious complication after joint replacement surgery.  - Mood/Energy Level - Many patients experience a lack of energy and endurance for up to 2-3 months after surgery.  Some may also feel down and can even become depressed.  This is likely due to the postoperative anemia, change in activity level, lack of sleep, pain medicine and just the emotional reaction to the surgery itself that is a big disruption in a person’s life.  This usually passes.  If symptoms persist, follow up with your primary physician.  - Returning to work - Your surgeon will give you more  specific instructions.  Generally, if you work a sedentary job requiring little standing or walking, most patients may return within 2-6 weeks.  Manual labor jobs involving walking, lifting and standing may take 3-4 months.  Your surgeon’s office can provide a release to part-time or light duty work early on in your recovery and progress you to full duty as able.  - Driving - You can begin driving an automatic shift car in 4 to 8 weeks, provided you are no longer taking narcotic pain medication. If you have a stick-shift car and your right hip was replaced, do not begin driving until your doctor says you can.   - Avoiding falls -  throw rugs and tack down loose carpeting.  Be aware of floor hazards such as pets, small objects or uneven surfaces.   -  Airport Metal Detectors - The sensitivity of metal detectors varies and it is likely that your prosthesis will cause an alarm. Inform the  that you have an artificial joint.  Diet  - Resume your normal diet as tolerated.  - It is important to achieve a healthy nutritional status by eating a well balanced diet on a regular basis.  - Your physician may recommend that you take iron and vitamin supplements.   - Continue to drink plenty of fluids.  Shower/Bathing  - You may shower as soon as you get home from the hospital unless otherwise instructed.  - Keep your incision out of water.  To keep the incision dry when showering, cover it with a plastic bag or plastic wrap.  - Pat incision dry if it gets wet.  Don’t rub.  - Do not submerge in a bath until staples are out and the incision is completely healed. (Approximately 6-8 weeks after surgery).  Dressing Change:  Procedure (if recommended by your physician)  - Wash hands.  - Open all dressing change materials.  - Remove old dressing and discard.  - Inspect incision for redness, increase in clear drainage, yellow/green drainage, odor and surrounding skin hot to touch.  -  ABD (large gauze) pad  by one corner and lay over the incision.  Be careful not to touch the inside of the dressing that will lay over the incision.  - Secure in place as instructed (Ace wrap or tape).    Swelling/Bruising  - Swelling is normal after hip replacement and can involve the thigh, knee, calf and foot.  - Swelling can last from 3-6 months.  - Elevate your leg higher than your heart while reclining.  The first week you are home you should elevate your leg an equal amount of time, as you are active.    - Anti-inflammatory pills can be taken once you have stopped the blood thinners.  - The swelling is usually worse after you go home since you are upright for longer periods of time.  - Bruising is common and can involve the entire leg including the thigh, calf and even foot.  Bruising often does not appear until after you arrive home and it can be quite dramatic- purple, black, green.  The bruising you can see is not usually concerning and will subside without any treatment.      Blood Clot Prevention  Blood clots in the legs and the less common, but frightening, clots that travel to the lungs are a real focus of our preventative. Most patients are at standard risk for them, but those patients who are at higher risk include people who have had previous clots, a family history of clotting, smoking, diabetes, obesity, advanced age, use of estrogen and a sedentary lifestyle.    - Signs of blood clots in legs - Swelling in thigh, calf or ankle that does not go down with elevation.  Pain, heat and tenderness in calf, back of calf or groin area.  NOTE: blood clots can occur in either leg.  - You have been receiving anticoagulant therapy (blood thinners) in the hospital and you may be instructed to continue at home depending on your risk factors.  - Your risk for developing a clot continues for up to 2-3 months after surgery.  You should avoid prolonged sitting and dehydration during that time (long air trips and car trips).  If you do  take a trip during this time, please get up and move around every 1- 1.5 hours.  - If you are prescribed blood thinning medication for home, follow instructions as directed. (Handouts provided if applicable).      Activity    Once you get home, you should stay active. The key is not to overdo it! While you can expect some good days and some bad days, you should notice a gradual improvement over time you should notice a gradual improvement and a gradual increase in your endurance over the next 6 to 12 months.    - Weight Bearing - If you have undergone cemented or hybrid hip replacement, you can put some weight on the leg immediately using a cane or walker, and you should continue to use some support for 4 to 6 weeks to help the muscles recover.   - Sleeping Positions - Sleep on your back with your legs slightly apart or on your side with a regular pillow between your knees. Be sure to use the pillow for at least 6 weeks, or until your doctor says you can do without it. Sleeping on your stomach should be all right  - Sitting - For at least the first 3 months, sit only in chairs that have arms. Do not sit on low chairs, low stools, or reclining chairs. Do not cross your legs at the knees. The physical therapist will show you how to sit and stand from a chair, keeping your affected leg out in front of you. Get up and move around on a regular basis--at least once every hour.  - Walking - Walk as much as you like once your doctor gives you the go-ahead, but remember that walking is no substitute for your prescribed exercises. Walking with a pair of trekking poles is helpful and adds as much as 40% to the exercise you get when you walk  - Therapy may be needed in some cases, to strengthen your muscles and improve your gait (walking pattern).  This decision will be made at your post-operative appointment.  Follow your therapist recommended post-operative exercises (handout provided by Therapist).  - Swimming is also  recommended; you can begin as soon as the sutures have been removed and the wound is healed, approximately 6 to 8 weeks after surgery. Using a pair of training fins may make swimming a more enjoyable and effective exercise.  - Other activities - Lower impact activities are preferred.  If you have specific questions, consult your Surgeon.    - Sexual activity - Your surgeon can tell you when it should be safe to resume sexual activity.      When to Call the Doctor   Call the physician if:   - Fever over 100.5? F  - Increased pain, drainage, redness, odor or heat around the incision area  - Shaking chills  - Increased knee pain with activity and rest  - Increased pain in calf, tenderness or redness above or below the knee  - Increased swelling of calf, ankle, foot  - Sudden increased shortness of breath, sudden onset of chest pain, localized chest pain with coughing  - Incision opening  Or, if there are any questions or concerns about medications or care.       -Is this patient being discharged with medication to prevent blood clots?  Yes, Aspirin Aspirin, ASA oral tablets  What is this medicine?  ASPIRIN (AS pir in) is a pain reliever. It is used to treat mild pain and fever. This medicine is also used as directed by a doctor to prevent and to treat heart attacks, to prevent strokes, and to treat arthritis or inflammation.  This medicine may be used for other purposes; ask your health care provider or pharmacist if you have questions.  COMMON BRAND NAME(S): Aspir-Low, Aspir-Nereida, Aspirtab, Jhon Advanced Aspirin, Jhon Aspirin, Jhon Aspirin Extra Strength, Jhon Aspirin Plus, Jhon Extra Strength, Jhon Extra Strength Plus, Jhon Genuine Aspirin, Jhon Womens Aspirin, Bufferin, Bufferin Extra Strength, Bufferin Low Dose  What should I tell my health care provider before I take this medicine?  They need to know if you have any of these conditions:  -anemia  -asthma  -bleeding problems  -child with chickenpox, the  flu, or other viral infection  -diabetes  -gout  -if you frequently drink alcohol containing drinks  -kidney disease  -liver disease  -low level of vitamin K  -lupus  -smoke tobacco  -stomach ulcers or other problems  -an unusual or allergic reaction to aspirin, tartrazine dye, other medicines, dyes, or preservatives  -pregnant or trying to get pregnant  -breast-feeding  How should I use this medicine?  Take this medicine by mouth with a glass of water. Follow the directions on the package or prescription label. You can take this medicine with or without food. If it upsets your stomach, take it with food. Do not take your medicine more often than directed.  Talk to your pediatrician regarding the use of this medicine in children. While this drug may be prescribed for children as young as 12 years of age for selected conditions, precautions do apply. Children and teenagers should not use this medicine to treat chicken pox or flu symptoms unless directed by a doctor.  Patients over 65 years old may have a stronger reaction and need a smaller dose.  Overdosage: If you think you have taken too much of this medicine contact a poison control center or emergency room at once.  NOTE: This medicine is only for you. Do not share this medicine with others.  What if I miss a dose?  If you are taking this medicine on a regular schedule and miss a dose, take it as soon as you can. If it is almost time for your next dose, take only that dose. Do not take double or extra doses.  What may interact with this medicine?  Do not take this medicine with any of the following medications:  -cidofovir  -ketorolac  -probenecid  This medicine may also interact with the following medications:  -alcohol  -alendronate  -bismuth subsalicylate  -flavocoxid  -herbal supplements like feverfew, garlic, osiel, ginkgo biloba, horse chestnut  -medicines for diabetes or glaucoma like acetazolamide, methazolamide  -medicines for gout  -medicines that  treat or prevent blood clots like enoxaparin, heparin, ticlopidine, warfarin  -other aspirin and aspirin-like medicines  -NSAIDs, medicines for pain and inflammation, like ibuprofen or naproxen  -pemetrexed  -sulfinpyrazone  -varicella live vaccine  This list may not describe all possible interactions. Give your health care provider a list of all the medicines, herbs, non-prescription drugs, or dietary supplements you use. Also tell them if you smoke, drink alcohol, or use illegal drugs. Some items may interact with your medicine.  What should I watch for while using this medicine?  If you are treating yourself for pain, tell your doctor or health care professional if the pain lasts more than 10 days, if it gets worse, or if there is a new or different kind of pain. Tell your doctor if you see redness or swelling. Also, check with your doctor if you have a fever that lasts for more than 3 days. Only take this medicine to prevent heart attacks or blood clotting if prescribed by your doctor or health care professional.  Do not take aspirin or aspirin-like medicines with this medicine. Too much aspirin can be dangerous. Always read the labels carefully.  This medicine can irritate your stomach or cause bleeding problems. Do not smoke cigarettes or drink alcohol while taking this medicine. Do not lie down for 30 minutes after taking this medicine to prevent irritation to your throat.  If you are scheduled for any medical or dental procedure, tell your healthcare provider that you are taking this medicine. You may need to stop taking this medicine before the procedure.  This medicine may be used to treat migraines. If you take migraine medicines for 10 or more days a month, your migraines may get worse. Keep a diary of headache days and medicine use. Contact your healthcare professional if your migraine attacks occur more frequently.  What side effects may I notice from receiving this medicine?  Side effects that you  should report to your doctor or health care professional as soon as possible:  -allergic reactions like skin rash, itching or hives, swelling of the face, lips, or tongue  -breathing problems  -changes in hearing, ringing in the ears  -confusion  -general ill feeling or flu-like symptoms  -pain on swallowing  -redness, blistering, peeling or loosening of the skin, including inside the mouth or nose  -signs and symptoms of bleeding such as bloody or black, tarry stools; red or dark-brown urine; spitting up blood or brown material that looks like coffee grounds; red spots on the skin; unusual bruising or bleeding from the eye, gums, or nose  -trouble passing urine or change in the amount of urine  -unusually weak or tired  -yellowing of the eyes or skin  Side effects that usually do not require medical attention (report to your doctor or health care professional if they continue or are bothersome):  -diarrhea or constipation  -headache  -nausea, vomiting  -stomach gas, heartburn  This list may not describe all possible side effects. Call your doctor for medical advice about side effects. You may report side effects to FDA at 4-091-FDA-1614.  Where should I keep my medicine?  Keep out of the reach of children.  Store at room temperature between 15 and 30 degrees C (59 and 86 degrees F). Protect from heat and moisture. Do not use this medicine if it has a strong vinegar smell. Throw away any unused medicine after the expiration date.  NOTE: This sheet is a summary. It may not cover all possible information. If you have questions about this medicine, talk to your doctor, pharmacist, or health care provider.  © 2018 [employer]/Gold Standard (2014-08-19 11:30:31)      · Is patient discharged on Warfarin / Coumadin?   No     Depression / Suicide Risk    As you are discharged from this [facility] facility, it is important to learn how to keep safe from harming yourself.    Recognize the warning signs:  · Abrupt changes in  personality, positive or negative- including increase in energy   · Giving away possessions  · Change in eating patterns- significant weight changes-  positive or negative  · Change in sleeping patterns- unable to sleep or sleeping all the time   · Unwillingness or inability to communicate  · Depression  · Unusual sadness, discouragement and loneliness  · Talk of wanting to die  · Neglect of personal appearance   · Rebelliousness- reckless behavior  · Withdrawal from people/activities they love  · Confusion- inability to concentrate     If you or a loved one observes any of these behaviors or has concerns about self-harm, here's what you can do:  · Talk about it- your feelings and reasons for harming yourself  · Remove any means that you might use to hurt yourself (examples: pills, rope, extension cords, firearm)  · Get professional help from the community (Mental Health, Substance Abuse, psychological counseling)  · Do not be alone:Call your Safe Contact- someone whom you trust who will be there for you.  · Call your local CRISIS HOTLINE 340-5448 or 621-118-8079  · Call your local Children's Mobile Crisis Response Team [place] (497) 090-7834 or www.[facility]  · Call the toll free National Suicide Prevention Hotlines   · National Suicide Prevention Lifeline 141-110-IHXT (4427)  · National Hope Line Network 800-SUICIDE (697-1000)

## 2018-05-26 NOTE — PROGRESS NOTES
Subjective  Patient is now postoperative day #2 status post revision left LEXI.  Patient did well overnight - no acute events or issues.  Pain is currently well controlled.  Patient denies any current or recent subjective numbness, tingling, weakness, fevers, chills, nausea, vomiting, chest pain, or shortness of breath.      Physical Exam  General: Patient is resting comfortably in bed.  No acute distress.    Lower Extremity: Surgical dressing is clean, dry, and intact.  Patient clearly fires tibialis anterior, EHL, and gastrocnemius/soleus.  Sensation is intact to light touch throughout superficial peroneal, deep peroneal, and tibial nerve distributions.  Strong and palpable 2+ dorsalis pedis and posterior tibial pulses with capillary refill less than 2 seconds.  No lower leg tenderness or discomfort.    Blood pressure 137/79, pulse 97, temperature 37.3 °C (99.1 °F), resp. rate 18, height 1.829 m (6'), weight 76.7 kg (169 lb 1.5 oz), SpO2 91 %.                    No intake or output data in the 24 hours ending 05/26/18 0714    Assessment  - Postoperative day #2 status post revision left LEXI - doing well    Plan  Antibiotics: Perioperative antibiotics complete  Hemoglobin: Hemodynamically stable  Cultures: None pending  DVT Prophylaxis: Mechanical (SCDs, compressive stockings), Plavix restarted yesterday  Diet: Advance as tolerated  Activity/PT: WBAT LLE with assistance  Disposition: Patient doing well.  Anticipate discharge to home.

## 2018-05-26 NOTE — PROGRESS NOTES
Patient ready for discharge to [facility]. Report called all belongings with patient. Paper work complete.  D/C complete

## 2018-05-26 NOTE — PROGRESS NOTES
Plan to discharge to [facility] today for skilled need. MD notified for updated discharge summary.

## 2018-07-20 NOTE — PROGRESS NOTES
Patient Kailash Haile was discharged  from [facility]  05/126/2018 for Total  Hip Arthroplasty the patient was transferred to [facility] upon discharge.  Patient was discharge on 06/19/2018, [facility] Patient Advocate assisted with multiple discharge orders including  confirming 1- Orthopedic  follow up with Ney Christensen on 06/28. The patient did not follow discharge orders to follow up with his Primary Care Physician and  no show  his appt on 06/27/2018.  [facility] also assisted with discharge orders for [facility] which the patient started utilizing  on 06/21/2018.  The patient is a ryan of the [facility] so [facility] was mainly in contacted with his public guardian on a weekly basis.  The patient has no future appointments scheduled.

## 2018-08-01 NOTE — TELEPHONE ENCOUNTER
1. Caller Name:  enid                                         Call Back Number:       Patient approves a detailed voicemail message: no    Pharmacy would like to know if you can send ne RX for pt for FLomax 0.4 1 AM was give to pt at discharge from hospital

## 2019-01-01 ENCOUNTER — APPOINTMENT (OUTPATIENT)
Dept: RADIOLOGY | Facility: MEDICAL CENTER | Age: 79
DRG: 064 | End: 2019-01-01
Attending: INTERNAL MEDICINE
Payer: MEDICARE

## 2019-01-01 ENCOUNTER — APPOINTMENT (OUTPATIENT)
Dept: RADIOLOGY | Facility: MEDICAL CENTER | Age: 79
DRG: 064 | End: 2019-01-01
Attending: EMERGENCY MEDICINE
Payer: MEDICARE

## 2019-01-01 ENCOUNTER — PATIENT OUTREACH (OUTPATIENT)
Dept: HEALTH INFORMATION MANAGEMENT | Facility: OTHER | Age: 79
End: 2019-01-01

## 2019-01-01 ENCOUNTER — HOSPITAL ENCOUNTER (OUTPATIENT)
Facility: MEDICAL CENTER | Age: 79
End: 2019-02-07
Attending: PHYSICIAN ASSISTANT
Payer: MEDICARE

## 2019-01-01 ENCOUNTER — OFFICE VISIT (OUTPATIENT)
Dept: URGENT CARE | Facility: CLINIC | Age: 79
End: 2019-01-01
Payer: MEDICARE

## 2019-01-01 ENCOUNTER — HOSPITAL ENCOUNTER (INPATIENT)
Facility: MEDICAL CENTER | Age: 79
LOS: 4 days | DRG: 064 | End: 2019-04-30
Attending: EMERGENCY MEDICINE | Admitting: HOSPITALIST
Payer: MEDICARE

## 2019-01-01 VITALS
HEART RATE: 84 BPM | RESPIRATION RATE: 16 BRPM | SYSTOLIC BLOOD PRESSURE: 140 MMHG | TEMPERATURE: 97.3 F | OXYGEN SATURATION: 97 % | DIASTOLIC BLOOD PRESSURE: 84 MMHG | BODY MASS INDEX: 22.93 KG/M2 | HEIGHT: 72 IN

## 2019-01-01 VITALS
HEART RATE: 91 BPM | TEMPERATURE: 102.6 F | WEIGHT: 158.73 LBS | DIASTOLIC BLOOD PRESSURE: 67 MMHG | BODY MASS INDEX: 21.5 KG/M2 | RESPIRATION RATE: 52 BRPM | OXYGEN SATURATION: 67 % | HEIGHT: 72 IN | SYSTOLIC BLOOD PRESSURE: 126 MMHG

## 2019-01-01 DIAGNOSIS — R35.0 FREQUENT URINATION: ICD-10-CM

## 2019-01-01 DIAGNOSIS — J96.01 ACUTE HYPOXEMIC RESPIRATORY FAILURE (HCC): ICD-10-CM

## 2019-01-01 DIAGNOSIS — M25.552 PAIN OF LEFT HIP JOINT: ICD-10-CM

## 2019-01-01 DIAGNOSIS — I10: ICD-10-CM

## 2019-01-01 DIAGNOSIS — I61.9: ICD-10-CM

## 2019-01-01 DIAGNOSIS — I61.1 NONTRAUMATIC CORTICAL HEMORRHAGE OF LEFT CEREBRAL HEMISPHERE (HCC): ICD-10-CM

## 2019-01-01 DIAGNOSIS — J96.01 ACUTE RESPIRATORY FAILURE WITH HYPOXIA (HCC): ICD-10-CM

## 2019-01-01 DIAGNOSIS — G47.00 INSOMNIA, UNSPECIFIED TYPE: ICD-10-CM

## 2019-01-01 LAB
ABO GROUP BLD: NORMAL
ACTION RANGE TRIGGERED IACRT: NO
ACTION RANGE TRIGGERED IACRT: NO
ALBUMIN SERPL BCP-MCNC: 3.6 G/DL (ref 3.2–4.9)
ALBUMIN SERPL BCP-MCNC: 4.1 G/DL (ref 3.2–4.9)
ALBUMIN/GLOB SERPL: 1.8 G/DL
ALBUMIN/GLOB SERPL: 1.9 G/DL
ALP SERPL-CCNC: 79 U/L (ref 30–99)
ALP SERPL-CCNC: 91 U/L (ref 30–99)
ALT SERPL-CCNC: 23 U/L (ref 2–50)
ALT SERPL-CCNC: 27 U/L (ref 2–50)
ANION GAP SERPL CALC-SCNC: 11 MMOL/L (ref 0–11.9)
ANION GAP SERPL CALC-SCNC: 12 MMOL/L (ref 0–11.9)
ANION GAP SERPL CALC-SCNC: 5 MMOL/L (ref 0–11.9)
ANION GAP SERPL CALC-SCNC: 6 MMOL/L (ref 0–11.9)
APPEARANCE UR: CLEAR
APPEARANCE UR: CLEAR
APTT PPP: 30.3 SEC (ref 24.7–36)
AST SERPL-CCNC: 28 U/L (ref 12–45)
AST SERPL-CCNC: 30 U/L (ref 12–45)
BACTERIA #/AREA URNS HPF: NEGATIVE /HPF
BACTERIA BRONCH AEROBE CULT: ABNORMAL
BACTERIA UR CULT: NORMAL
BASE EXCESS BLDA CALC-SCNC: -4 MMOL/L (ref -4–3)
BASE EXCESS BLDA CALC-SCNC: -4 MMOL/L (ref -4–3)
BASOPHILS # BLD AUTO: 0.2 % (ref 0–1.8)
BASOPHILS # BLD AUTO: 0.2 % (ref 0–1.8)
BASOPHILS # BLD: 0.04 K/UL (ref 0–0.12)
BASOPHILS # BLD: 0.05 K/UL (ref 0–0.12)
BILIRUB SERPL-MCNC: 0.7 MG/DL (ref 0.1–1.5)
BILIRUB SERPL-MCNC: 0.7 MG/DL (ref 0.1–1.5)
BILIRUB UR QL STRIP.AUTO: NEGATIVE
BILIRUB UR STRIP-MCNC: NEGATIVE MG/DL
BLD GP AB SCN SERPL QL: NORMAL
BODY TEMPERATURE: ABNORMAL DEGREES
BODY TEMPERATURE: ABNORMAL DEGREES
BUN SERPL-MCNC: 18 MG/DL (ref 8–22)
BUN SERPL-MCNC: 20 MG/DL (ref 8–22)
BUN SERPL-MCNC: 20 MG/DL (ref 8–22)
BUN SERPL-MCNC: 21 MG/DL (ref 8–22)
CALCIUM SERPL-MCNC: 8.9 MG/DL (ref 8.5–10.5)
CALCIUM SERPL-MCNC: 9 MG/DL (ref 8.5–10.5)
CALCIUM SERPL-MCNC: 9.3 MG/DL (ref 8.5–10.5)
CALCIUM SERPL-MCNC: 9.9 MG/DL (ref 8.5–10.5)
CFT BLD TEG: 3.9 MIN (ref 5–10)
CHLORIDE SERPL-SCNC: 109 MMOL/L (ref 96–112)
CHLORIDE SERPL-SCNC: 111 MMOL/L (ref 96–112)
CHLORIDE SERPL-SCNC: 112 MMOL/L (ref 96–112)
CHLORIDE SERPL-SCNC: 117 MMOL/L (ref 96–112)
CHOLEST SERPL-MCNC: 143 MG/DL (ref 100–199)
CLOT ANGLE BLD TEG: 66.5 DEGREES (ref 53–72)
CLOT LYSIS 30M P MA LENFR BLD TEG: 0 % (ref 0–8)
CO2 BLDA-SCNC: 21 MMOL/L (ref 20–33)
CO2 BLDA-SCNC: 21 MMOL/L (ref 20–33)
CO2 SERPL-SCNC: 20 MMOL/L (ref 20–33)
CO2 SERPL-SCNC: 22 MMOL/L (ref 20–33)
COLOR UR AUTO: YELLOW
COLOR UR: YELLOW
CREAT SERPL-MCNC: 0.61 MG/DL (ref 0.5–1.4)
CREAT SERPL-MCNC: 0.63 MG/DL (ref 0.5–1.4)
CREAT SERPL-MCNC: 0.63 MG/DL (ref 0.5–1.4)
CREAT SERPL-MCNC: 0.64 MG/DL (ref 0.5–1.4)
CT.EXTRINSIC BLD ROTEM: 1.8 MIN (ref 1–3)
CYTOLOGY REG CYTOL: NORMAL
EKG IMPRESSION: NORMAL
EOSINOPHIL # BLD AUTO: 0 K/UL (ref 0–0.51)
EOSINOPHIL # BLD AUTO: 0 K/UL (ref 0–0.51)
EOSINOPHIL NFR BLD: 0 % (ref 0–6.9)
EOSINOPHIL NFR BLD: 0 % (ref 0–6.9)
EPI CELLS #/AREA URNS HPF: ABNORMAL /HPF
ERYTHROCYTE [DISTWIDTH] IN BLOOD BY AUTOMATED COUNT: 48.6 FL (ref 35.9–50)
ERYTHROCYTE [DISTWIDTH] IN BLOOD BY AUTOMATED COUNT: 48.9 FL (ref 35.9–50)
GLOBULIN SER CALC-MCNC: 2 G/DL (ref 1.9–3.5)
GLOBULIN SER CALC-MCNC: 2.2 G/DL (ref 1.9–3.5)
GLUCOSE BLD-MCNC: 127 MG/DL (ref 65–99)
GLUCOSE BLD-MCNC: 146 MG/DL (ref 65–99)
GLUCOSE BLD-MCNC: 180 MG/DL (ref 65–99)
GLUCOSE SERPL-MCNC: 122 MG/DL (ref 65–99)
GLUCOSE SERPL-MCNC: 140 MG/DL (ref 65–99)
GLUCOSE SERPL-MCNC: 177 MG/DL (ref 65–99)
GLUCOSE SERPL-MCNC: 200 MG/DL (ref 65–99)
GLUCOSE UR STRIP.AUTO-MCNC: NEGATIVE MG/DL
GLUCOSE UR STRIP.AUTO-MCNC: NEGATIVE MG/DL
GRAM STN SPEC: ABNORMAL
GRAM STN SPEC: ABNORMAL
HCO3 BLDA-SCNC: 20 MMOL/L (ref 17–25)
HCO3 BLDA-SCNC: 20.1 MMOL/L (ref 17–25)
HCT VFR BLD AUTO: 38.3 % (ref 42–52)
HCT VFR BLD AUTO: 41.5 % (ref 42–52)
HDLC SERPL-MCNC: 60 MG/DL
HGB BLD-MCNC: 12.7 G/DL (ref 14–18)
HGB BLD-MCNC: 13.8 G/DL (ref 14–18)
HOROWITZ INDEX BLDA+IHG-RTO: 217 MM[HG]
HOROWITZ INDEX BLDA+IHG-RTO: 260 MM[HG]
IMM GRANULOCYTES # BLD AUTO: 0.11 K/UL (ref 0–0.11)
IMM GRANULOCYTES # BLD AUTO: 0.18 K/UL (ref 0–0.11)
IMM GRANULOCYTES NFR BLD AUTO: 0.5 % (ref 0–0.9)
IMM GRANULOCYTES NFR BLD AUTO: 0.7 % (ref 0–0.9)
INR PPP: 1.07 (ref 0.87–1.13)
INST. QUALIFIED PATIENT IIQPT: YES
INST. QUALIFIED PATIENT IIQPT: YES
KETONES UR STRIP.AUTO-MCNC: 80 MG/DL
KETONES UR STRIP.AUTO-MCNC: NORMAL MG/DL
LACTATE BLD-SCNC: 1.5 MMOL/L (ref 0.5–2)
LDLC SERPL CALC-MCNC: 74 MG/DL
LEUKOCYTE ESTERASE UR QL STRIP.AUTO: NEGATIVE
LEUKOCYTE ESTERASE UR QL STRIP.AUTO: NEGATIVE
LYMPHOCYTES # BLD AUTO: 0.51 K/UL (ref 1–4.8)
LYMPHOCYTES # BLD AUTO: 0.55 K/UL (ref 1–4.8)
LYMPHOCYTES NFR BLD: 1.9 % (ref 22–41)
LYMPHOCYTES NFR BLD: 2.4 % (ref 22–41)
MAGNESIUM SERPL-MCNC: 1.8 MG/DL (ref 1.5–2.5)
MCF BLD TEG: 67.3 MM (ref 50–70)
MCH RBC QN AUTO: 31.6 PG (ref 27–33)
MCH RBC QN AUTO: 31.9 PG (ref 27–33)
MCHC RBC AUTO-ENTMCNC: 33.2 G/DL (ref 33.7–35.3)
MCHC RBC AUTO-ENTMCNC: 33.3 G/DL (ref 33.7–35.3)
MCV RBC AUTO: 95.3 FL (ref 81.4–97.8)
MCV RBC AUTO: 96.1 FL (ref 81.4–97.8)
MICRO URNS: ABNORMAL
MONOCYTES # BLD AUTO: 0.91 K/UL (ref 0–0.85)
MONOCYTES # BLD AUTO: 0.92 K/UL (ref 0–0.85)
MONOCYTES NFR BLD AUTO: 3.4 % (ref 0–13.4)
MONOCYTES NFR BLD AUTO: 4 % (ref 0–13.4)
NEUTROPHILS # BLD AUTO: 21 K/UL (ref 1.82–7.42)
NEUTROPHILS # BLD AUTO: 25.06 K/UL (ref 1.82–7.42)
NEUTROPHILS NFR BLD: 92.9 % (ref 44–72)
NEUTROPHILS NFR BLD: 93.8 % (ref 44–72)
NITRITE UR QL STRIP.AUTO: NEGATIVE
NITRITE UR QL STRIP.AUTO: NEGATIVE
NRBC # BLD AUTO: 0 K/UL
NRBC # BLD AUTO: 0 K/UL
NRBC BLD-RTO: 0 /100 WBC
NRBC BLD-RTO: 0 /100 WBC
O2/TOTAL GAS SETTING VFR VENT: 40 %
O2/TOTAL GAS SETTING VFR VENT: 60 %
PA AA BLD-ACNC: 10.5 %
PA ADP BLD-ACNC: 40.4 %
PCO2 BLDA: 33.7 MMHG (ref 26–37)
PCO2 BLDA: 34 MMHG (ref 26–37)
PCO2 TEMP ADJ BLDA: 35.5 MMHG (ref 26–37)
PH BLDA: 7.38 [PH] (ref 7.4–7.5)
PH BLDA: 7.38 [PH] (ref 7.4–7.5)
PH TEMP ADJ BLDA: 7.36 [PH] (ref 7.4–7.5)
PH UR STRIP.AUTO: 6 [PH]
PH UR STRIP.AUTO: 6 [PH] (ref 5–8)
PLATELET # BLD AUTO: 263 K/UL (ref 164–446)
PLATELET # BLD AUTO: 275 K/UL (ref 164–446)
PMV BLD AUTO: 10.2 FL (ref 9–12.9)
PMV BLD AUTO: 10.4 FL (ref 9–12.9)
PO2 BLDA: 104 MMHG (ref 64–87)
PO2 BLDA: 130 MMHG (ref 64–87)
PO2 TEMP ADJ BLDA: 111 MMHG (ref 64–87)
POTASSIUM SERPL-SCNC: 3.6 MMOL/L (ref 3.6–5.5)
POTASSIUM SERPL-SCNC: 3.7 MMOL/L (ref 3.6–5.5)
POTASSIUM SERPL-SCNC: 3.8 MMOL/L (ref 3.6–5.5)
POTASSIUM SERPL-SCNC: 5 MMOL/L (ref 3.6–5.5)
PROT SERPL-MCNC: 5.6 G/DL (ref 6–8.2)
PROT SERPL-MCNC: 6.3 G/DL (ref 6–8.2)
PROT UR QL STRIP: NEGATIVE MG/DL
PROT UR QL STRIP: NEGATIVE MG/DL
PROTHROMBIN TIME: 14.1 SEC (ref 12–14.6)
RBC # BLD AUTO: 4.02 M/UL (ref 4.7–6.1)
RBC # BLD AUTO: 4.32 M/UL (ref 4.7–6.1)
RBC # URNS HPF: ABNORMAL /HPF
RBC UR QL AUTO: ABNORMAL
RBC UR QL AUTO: NEGATIVE
RH BLD: NORMAL
RHODAMINE-AURAMINE STN SPEC: NORMAL
SAO2 % BLDA: 98 % (ref 93–99)
SAO2 % BLDA: 99 % (ref 93–99)
SIGNIFICANT IND 70042: ABNORMAL
SIGNIFICANT IND 70042: ABNORMAL
SIGNIFICANT IND 70042: NORMAL
SIGNIFICANT IND 70042: NORMAL
SITE SITE: ABNORMAL
SITE SITE: ABNORMAL
SITE SITE: NORMAL
SITE SITE: NORMAL
SODIUM SERPL-SCNC: 139 MMOL/L (ref 135–145)
SODIUM SERPL-SCNC: 142 MMOL/L (ref 135–145)
SODIUM SERPL-SCNC: 143 MMOL/L (ref 135–145)
SODIUM SERPL-SCNC: 145 MMOL/L (ref 135–145)
SOURCE SOURCE: ABNORMAL
SOURCE SOURCE: ABNORMAL
SOURCE SOURCE: NORMAL
SOURCE SOURCE: NORMAL
SP GR UR REFRACTOMETRY: >1.045
SP GR UR STRIP.AUTO: 1.01
SPECIMEN DRAWN FROM PATIENT: ABNORMAL
SPECIMEN DRAWN FROM PATIENT: ABNORMAL
TEG ALGORITHM TGALG: ABNORMAL
TRIGL SERPL-MCNC: 44 MG/DL (ref 0–149)
TRIGL SERPL-MCNC: 44 MG/DL (ref 0–149)
TROPONIN I SERPL-MCNC: 0.02 NG/ML (ref 0–0.04)
UROBILINOGEN UR STRIP-MCNC: 1 MG/DL
UROBILINOGEN UR STRIP.AUTO-MCNC: 1 MG/DL
WBC # BLD AUTO: 22.6 K/UL (ref 4.8–10.8)
WBC # BLD AUTO: 26.7 K/UL (ref 4.8–10.8)
WBC #/AREA URNS HPF: ABNORMAL /HPF

## 2019-01-01 PROCEDURE — 80053 COMPREHEN METABOLIC PANEL: CPT

## 2019-01-01 PROCEDURE — 0042T CT-CEREBRAL PERFUSION ANALYSIS: CPT

## 2019-01-01 PROCEDURE — 0B938ZZ DRAINAGE OF RIGHT MAIN BRONCHUS, VIA NATURAL OR ARTIFICIAL OPENING ENDOSCOPIC: ICD-10-PCS | Performed by: INTERNAL MEDICINE

## 2019-01-01 PROCEDURE — 85576 BLOOD PLATELET AGGREGATION: CPT | Mod: 91

## 2019-01-01 PROCEDURE — 83735 ASSAY OF MAGNESIUM: CPT

## 2019-01-01 PROCEDURE — 5A1935Z RESPIRATORY VENTILATION, LESS THAN 24 CONSECUTIVE HOURS: ICD-10-PCS | Performed by: EMERGENCY MEDICINE

## 2019-01-01 PROCEDURE — 0B9F8ZX DRAINAGE OF RIGHT LOWER LUNG LOBE, VIA NATURAL OR ARTIFICIAL OPENING ENDOSCOPIC, DIAGNOSTIC: ICD-10-PCS | Performed by: INTERNAL MEDICINE

## 2019-01-01 PROCEDURE — 93005 ELECTROCARDIOGRAM TRACING: CPT | Performed by: EMERGENCY MEDICINE

## 2019-01-01 PROCEDURE — A9270 NON-COVERED ITEM OR SERVICE: HCPCS | Performed by: HOSPITALIST

## 2019-01-01 PROCEDURE — 302978 HCHG BRONCHOSCOPY-DIAGNOSTIC

## 2019-01-01 PROCEDURE — 770004 HCHG ROOM/CARE - ONCOLOGY PRIVATE *

## 2019-01-01 PROCEDURE — 700102 HCHG RX REV CODE 250 W/ 637 OVERRIDE(OP): Performed by: HOSPITALIST

## 2019-01-01 PROCEDURE — 85730 THROMBOPLASTIN TIME PARTIAL: CPT

## 2019-01-01 PROCEDURE — 99291 CRITICAL CARE FIRST HOUR: CPT | Performed by: INTERNAL MEDICINE

## 2019-01-01 PROCEDURE — 99233 SBSQ HOSP IP/OBS HIGH 50: CPT | Performed by: INTERNAL MEDICINE

## 2019-01-01 PROCEDURE — 700117 HCHG RX CONTRAST REV CODE 255: Performed by: EMERGENCY MEDICINE

## 2019-01-01 PROCEDURE — 36415 COLL VENOUS BLD VENIPUNCTURE: CPT

## 2019-01-01 PROCEDURE — 770022 HCHG ROOM/CARE - ICU (200)

## 2019-01-01 PROCEDURE — 700111 HCHG RX REV CODE 636 W/ 250 OVERRIDE (IP): Performed by: HOSPITALIST

## 2019-01-01 PROCEDURE — 94002 VENT MGMT INPAT INIT DAY: CPT

## 2019-01-01 PROCEDURE — 85025 COMPLETE CBC W/AUTO DIFF WBC: CPT

## 2019-01-01 PROCEDURE — 87116 MYCOBACTERIA CULTURE: CPT

## 2019-01-01 PROCEDURE — 99239 HOSP IP/OBS DSCHRG MGMT >30: CPT | Performed by: HOSPITALIST

## 2019-01-01 PROCEDURE — 99223 1ST HOSP IP/OBS HIGH 75: CPT | Performed by: PSYCHIATRY & NEUROLOGY

## 2019-01-01 PROCEDURE — 700101 HCHG RX REV CODE 250: Performed by: INTERNAL MEDICINE

## 2019-01-01 PROCEDURE — 0B958ZZ DRAINAGE OF RIGHT MIDDLE LOBE BRONCHUS, VIA NATURAL OR ARTIFICIAL OPENING ENDOSCOPIC: ICD-10-PCS | Performed by: INTERNAL MEDICINE

## 2019-01-01 PROCEDURE — 700105 HCHG RX REV CODE 258: Performed by: INTERNAL MEDICINE

## 2019-01-01 PROCEDURE — 0B9B8ZZ DRAINAGE OF LEFT LOWER LOBE BRONCHUS, VIA NATURAL OR ARTIFICIAL OPENING ENDOSCOPIC: ICD-10-PCS | Performed by: INTERNAL MEDICINE

## 2019-01-01 PROCEDURE — 31624 DX BRONCHOSCOPE/LAVAGE: CPT | Performed by: INTERNAL MEDICINE

## 2019-01-01 PROCEDURE — 36620 INSERTION CATHETER ARTERY: CPT

## 2019-01-01 PROCEDURE — 36556 INSERT NON-TUNNEL CV CATH: CPT

## 2019-01-01 PROCEDURE — 86901 BLOOD TYPING SEROLOGIC RH(D): CPT

## 2019-01-01 PROCEDURE — 99233 SBSQ HOSP IP/OBS HIGH 50: CPT | Performed by: HOSPITALIST

## 2019-01-01 PROCEDURE — 82803 BLOOD GASES ANY COMBINATION: CPT

## 2019-01-01 PROCEDURE — 700105 HCHG RX REV CODE 258: Performed by: EMERGENCY MEDICINE

## 2019-01-01 PROCEDURE — 86850 RBC ANTIBODY SCREEN: CPT

## 2019-01-01 PROCEDURE — 70450 CT HEAD/BRAIN W/O DYE: CPT

## 2019-01-01 PROCEDURE — 71045 X-RAY EXAM CHEST 1 VIEW: CPT

## 2019-01-01 PROCEDURE — 302214 INTUBATION BOX: Performed by: EMERGENCY MEDICINE

## 2019-01-01 PROCEDURE — 87086 URINE CULTURE/COLONY COUNT: CPT

## 2019-01-01 PROCEDURE — 85610 PROTHROMBIN TIME: CPT

## 2019-01-01 PROCEDURE — 96365 THER/PROPH/DIAG IV INF INIT: CPT

## 2019-01-01 PROCEDURE — 36556 INSERT NON-TUNNEL CV CATH: CPT | Mod: RT | Performed by: INTERNAL MEDICINE

## 2019-01-01 PROCEDURE — 99223 1ST HOSP IP/OBS HIGH 75: CPT | Performed by: HOSPITALIST

## 2019-01-01 PROCEDURE — 87015 SPECIMEN INFECT AGNT CONCNTJ: CPT

## 2019-01-01 PROCEDURE — 304561 HCHG STAT O2

## 2019-01-01 PROCEDURE — 94003 VENT MGMT INPAT SUBQ DAY: CPT

## 2019-01-01 PROCEDURE — 96366 THER/PROPH/DIAG IV INF ADDON: CPT

## 2019-01-01 PROCEDURE — 700102 HCHG RX REV CODE 250 W/ 637 OVERRIDE(OP): Performed by: INTERNAL MEDICINE

## 2019-01-01 PROCEDURE — 700105 HCHG RX REV CODE 258: Performed by: HOSPITALIST

## 2019-01-01 PROCEDURE — 87206 SMEAR FLUORESCENT/ACID STAI: CPT

## 2019-01-01 PROCEDURE — 86900 BLOOD TYPING SEROLOGIC ABO: CPT

## 2019-01-01 PROCEDURE — 03HY32Z INSERTION OF MONITORING DEVICE INTO UPPER ARTERY, PERCUTANEOUS APPROACH: ICD-10-PCS | Performed by: INTERNAL MEDICINE

## 2019-01-01 PROCEDURE — 0B968ZZ DRAINAGE OF RIGHT LOWER LOBE BRONCHUS, VIA NATURAL OR ARTIFICIAL OPENING ENDOSCOPIC: ICD-10-PCS | Performed by: INTERNAL MEDICINE

## 2019-01-01 PROCEDURE — 99292 CRITICAL CARE ADDL 30 MIN: CPT | Performed by: INTERNAL MEDICINE

## 2019-01-01 PROCEDURE — C1751 CATH, INF, PER/CENT/MIDLINE: HCPCS

## 2019-01-01 PROCEDURE — 0B978ZZ DRAINAGE OF LEFT MAIN BRONCHUS, VIA NATURAL OR ARTIFICIAL OPENING ENDOSCOPIC: ICD-10-PCS | Performed by: INTERNAL MEDICINE

## 2019-01-01 PROCEDURE — 31500 INSERT EMERGENCY AIRWAY: CPT

## 2019-01-01 PROCEDURE — 99291 CRITICAL CARE FIRST HOUR: CPT

## 2019-01-01 PROCEDURE — 88112 CYTOPATH CELL ENHANCE TECH: CPT

## 2019-01-01 PROCEDURE — 80061 LIPID PANEL: CPT

## 2019-01-01 PROCEDURE — 96368 THER/DIAG CONCURRENT INF: CPT

## 2019-01-01 PROCEDURE — 82962 GLUCOSE BLOOD TEST: CPT

## 2019-01-01 PROCEDURE — 80048 BASIC METABOLIC PNL TOTAL CA: CPT | Mod: 91

## 2019-01-01 PROCEDURE — 0BH18EZ INSERTION OF ENDOTRACHEAL AIRWAY INTO TRACHEA, VIA NATURAL OR ARTIFICIAL OPENING ENDOSCOPIC: ICD-10-PCS | Performed by: EMERGENCY MEDICINE

## 2019-01-01 PROCEDURE — 700101 HCHG RX REV CODE 250: Performed by: HOSPITALIST

## 2019-01-01 PROCEDURE — 84484 ASSAY OF TROPONIN QUANT: CPT

## 2019-01-01 PROCEDURE — 87102 FUNGUS ISOLATION CULTURE: CPT

## 2019-01-01 PROCEDURE — 87106 FUNGI IDENTIFICATION YEAST: CPT

## 2019-01-01 PROCEDURE — 700111 HCHG RX REV CODE 636 W/ 250 OVERRIDE (IP): Performed by: EMERGENCY MEDICINE

## 2019-01-01 PROCEDURE — 81001 URINALYSIS AUTO W/SCOPE: CPT

## 2019-01-01 PROCEDURE — A9270 NON-COVERED ITEM OR SERVICE: HCPCS | Performed by: INTERNAL MEDICINE

## 2019-01-01 PROCEDURE — 88305 TISSUE EXAM BY PATHOLOGIST: CPT

## 2019-01-01 PROCEDURE — 700111 HCHG RX REV CODE 636 W/ 250 OVERRIDE (IP): Performed by: INTERNAL MEDICINE

## 2019-01-01 PROCEDURE — 70498 CT ANGIOGRAPHY NECK: CPT

## 2019-01-01 PROCEDURE — 85384 FIBRINOGEN ACTIVITY: CPT

## 2019-01-01 PROCEDURE — 303105 HCHG CATHETER EXTRA

## 2019-01-01 PROCEDURE — 31645 BRNCHSC W/THER ASPIR 1ST: CPT | Performed by: INTERNAL MEDICINE

## 2019-01-01 PROCEDURE — 83605 ASSAY OF LACTIC ACID: CPT

## 2019-01-01 PROCEDURE — 87205 SMEAR GRAM STAIN: CPT

## 2019-01-01 PROCEDURE — 51702 INSERT TEMP BLADDER CATH: CPT

## 2019-01-01 PROCEDURE — 85347 COAGULATION TIME ACTIVATED: CPT

## 2019-01-01 PROCEDURE — 36620 INSERTION CATHETER ARTERY: CPT | Performed by: INTERNAL MEDICINE

## 2019-01-01 PROCEDURE — 81002 URINALYSIS NONAUTO W/O SCOPE: CPT | Performed by: PHYSICIAN ASSISTANT

## 2019-01-01 PROCEDURE — 36600 WITHDRAWAL OF ARTERIAL BLOOD: CPT

## 2019-01-01 PROCEDURE — 99214 OFFICE O/P EST MOD 30 MIN: CPT | Performed by: PHYSICIAN ASSISTANT

## 2019-01-01 PROCEDURE — 99232 SBSQ HOSP IP/OBS MODERATE 35: CPT | Performed by: INTERNAL MEDICINE

## 2019-01-01 PROCEDURE — 37799 UNLISTED PX VASCULAR SURGERY: CPT

## 2019-01-01 PROCEDURE — 84478 ASSAY OF TRIGLYCERIDES: CPT

## 2019-01-01 PROCEDURE — 70496 CT ANGIOGRAPHY HEAD: CPT

## 2019-01-01 PROCEDURE — 700101 HCHG RX REV CODE 250: Performed by: EMERGENCY MEDICINE

## 2019-01-01 PROCEDURE — 02HV33Z INSERTION OF INFUSION DEVICE INTO SUPERIOR VENA CAVA, PERCUTANEOUS APPROACH: ICD-10-PCS | Performed by: INTERNAL MEDICINE

## 2019-01-01 PROCEDURE — 87070 CULTURE OTHR SPECIMN AEROBIC: CPT

## 2019-01-01 RX ORDER — FAMOTIDINE 20 MG/1
20 TABLET, FILM COATED ORAL EVERY 12 HOURS
Status: DISCONTINUED | OUTPATIENT
Start: 2019-01-01 | End: 2019-01-01

## 2019-01-01 RX ORDER — GLYCOPYRROLATE 0.2 MG/ML
0.2 INJECTION INTRAMUSCULAR; INTRAVENOUS EVERY 4 HOURS PRN
Status: DISCONTINUED | OUTPATIENT
Start: 2019-01-01 | End: 2019-01-01 | Stop reason: HOSPADM

## 2019-01-01 RX ORDER — LORAZEPAM 2 MG/ML
2 INJECTION INTRAMUSCULAR
Status: DISCONTINUED | OUTPATIENT
Start: 2019-01-01 | End: 2019-01-01 | Stop reason: HOSPADM

## 2019-01-01 RX ORDER — ONDANSETRON 4 MG/1
4 TABLET, ORALLY DISINTEGRATING ORAL EVERY 4 HOURS PRN
Status: DISCONTINUED | OUTPATIENT
Start: 2019-01-01 | End: 2019-01-01

## 2019-01-01 RX ORDER — MORPHINE SULFATE 100 MG/5ML
10-20 SOLUTION ORAL
Status: DISCONTINUED | OUTPATIENT
Start: 2019-01-01 | End: 2019-01-01 | Stop reason: HOSPADM

## 2019-01-01 RX ORDER — DONEPEZIL HYDROCHLORIDE 5 MG/1
10 TABLET, FILM COATED ORAL NIGHTLY
Status: DISCONTINUED | OUTPATIENT
Start: 2019-01-01 | End: 2019-01-01

## 2019-01-01 RX ORDER — DEXTROSE MONOHYDRATE 25 G/50ML
25 INJECTION, SOLUTION INTRAVENOUS
Status: DISCONTINUED | OUTPATIENT
Start: 2019-01-01 | End: 2019-01-01

## 2019-01-01 RX ORDER — BISACODYL 10 MG
10 SUPPOSITORY, RECTAL RECTAL
Status: DISCONTINUED | OUTPATIENT
Start: 2019-01-01 | End: 2019-01-01

## 2019-01-01 RX ORDER — LABETALOL HYDROCHLORIDE 5 MG/ML
10 INJECTION, SOLUTION INTRAVENOUS
Status: DISCONTINUED | OUTPATIENT
Start: 2019-01-01 | End: 2019-01-01

## 2019-01-01 RX ORDER — GLYCOPYRROLATE 0.2 MG/ML
0.2 INJECTION INTRAMUSCULAR; INTRAVENOUS ONCE
Status: COMPLETED | OUTPATIENT
Start: 2019-01-01 | End: 2019-01-01

## 2019-01-01 RX ORDER — POLYETHYLENE GLYCOL 3350 17 G/17G
1 POWDER, FOR SOLUTION ORAL
Status: DISCONTINUED | OUTPATIENT
Start: 2019-01-01 | End: 2019-01-01

## 2019-01-01 RX ORDER — LANOLIN ALCOHOL/MO/W.PET/CERES
CREAM (GRAM) TOPICAL
Qty: 60 TAB | Refills: 0 | Status: SHIPPED | OUTPATIENT
Start: 2019-01-01

## 2019-01-01 RX ORDER — 3% SODIUM CHLORIDE 3 G/100ML
500 INJECTION, SOLUTION INTRAVENOUS CONTINUOUS
Status: DISCONTINUED | OUTPATIENT
Start: 2019-01-01 | End: 2019-01-01

## 2019-01-01 RX ORDER — ATROPINE SULFATE 10 MG/ML
2 SOLUTION/ DROPS OPHTHALMIC EVERY 4 HOURS PRN
Status: DISCONTINUED | OUTPATIENT
Start: 2019-01-01 | End: 2019-01-01 | Stop reason: HOSPADM

## 2019-01-01 RX ORDER — ACETAMINOPHEN 325 MG/1
650 TABLET ORAL EVERY 4 HOURS PRN
Status: DISCONTINUED | OUTPATIENT
Start: 2019-01-01 | End: 2019-01-01 | Stop reason: HOSPADM

## 2019-01-01 RX ORDER — POTASSIUM CHLORIDE 1.5 G/1.58G
40 POWDER, FOR SOLUTION ORAL ONCE
Status: COMPLETED | OUTPATIENT
Start: 2019-01-01 | End: 2019-01-01

## 2019-01-01 RX ORDER — MAGNESIUM SULFATE HEPTAHYDRATE 40 MG/ML
2 INJECTION, SOLUTION INTRAVENOUS ONCE
Status: COMPLETED | OUTPATIENT
Start: 2019-01-01 | End: 2019-01-01

## 2019-01-01 RX ORDER — MEMANTINE HYDROCHLORIDE 28 MG/1
28 CAPSULE, EXTENDED RELEASE ORAL DAILY
Status: DISCONTINUED | OUTPATIENT
Start: 2019-01-01 | End: 2019-01-01

## 2019-01-01 RX ORDER — ONDANSETRON 2 MG/ML
4 INJECTION INTRAMUSCULAR; INTRAVENOUS EVERY 4 HOURS PRN
Status: DISCONTINUED | OUTPATIENT
Start: 2019-01-01 | End: 2019-01-01

## 2019-01-01 RX ORDER — ACETAMINOPHEN 500 MG
1000 TABLET ORAL EVERY 6 HOURS PRN
Qty: 60 TAB | Refills: 0 | Status: SHIPPED | OUTPATIENT
Start: 2019-01-01 | End: 2019-01-01

## 2019-01-01 RX ORDER — AMOXICILLIN 250 MG
2 CAPSULE ORAL 2 TIMES DAILY
Status: DISCONTINUED | OUTPATIENT
Start: 2019-01-01 | End: 2019-01-01

## 2019-01-01 RX ORDER — ACETAMINOPHEN 650 MG/1
650 SUPPOSITORY RECTAL EVERY 4 HOURS PRN
Status: DISCONTINUED | OUTPATIENT
Start: 2019-01-01 | End: 2019-01-01 | Stop reason: HOSPADM

## 2019-01-01 RX ORDER — ONDANSETRON 2 MG/ML
4 INJECTION INTRAMUSCULAR; INTRAVENOUS EVERY 4 HOURS PRN
Status: DISCONTINUED | OUTPATIENT
Start: 2019-01-01 | End: 2019-01-01 | Stop reason: HOSPADM

## 2019-01-01 RX ORDER — SODIUM CHLORIDE 9 MG/ML
INJECTION, SOLUTION INTRAVENOUS CONTINUOUS
Status: DISCONTINUED | OUTPATIENT
Start: 2019-01-01 | End: 2019-01-01

## 2019-01-01 RX ORDER — ACETAMINOPHEN 650 MG/1
650 SUPPOSITORY RECTAL EVERY 4 HOURS PRN
Status: DISCONTINUED | OUTPATIENT
Start: 2019-01-01 | End: 2019-01-01

## 2019-01-01 RX ORDER — LORAZEPAM 2 MG/ML
2 INJECTION INTRAMUSCULAR
Status: DISCONTINUED | OUTPATIENT
Start: 2019-01-01 | End: 2019-01-01

## 2019-01-01 RX ORDER — ONDANSETRON 4 MG/1
4 TABLET, ORALLY DISINTEGRATING ORAL EVERY 4 HOURS PRN
Status: DISCONTINUED | OUTPATIENT
Start: 2019-01-01 | End: 2019-01-01 | Stop reason: HOSPADM

## 2019-01-01 RX ORDER — HYDRALAZINE HYDROCHLORIDE 20 MG/ML
20 INJECTION INTRAMUSCULAR; INTRAVENOUS EVERY 4 HOURS PRN
Status: DISCONTINUED | OUTPATIENT
Start: 2019-01-01 | End: 2019-01-01

## 2019-01-01 RX ORDER — MORPHINE SULFATE 4 MG/ML
4 INJECTION, SOLUTION INTRAMUSCULAR; INTRAVENOUS
Status: DISCONTINUED | OUTPATIENT
Start: 2019-01-01 | End: 2019-01-01 | Stop reason: HOSPADM

## 2019-01-01 RX ORDER — LORAZEPAM 2 MG/ML
2 CONCENTRATE ORAL
Status: DISCONTINUED | OUTPATIENT
Start: 2019-01-01 | End: 2019-01-01 | Stop reason: HOSPADM

## 2019-01-01 RX ORDER — ACETAMINOPHEN 325 MG/1
650 TABLET ORAL EVERY 4 HOURS PRN
Status: DISCONTINUED | OUTPATIENT
Start: 2019-01-01 | End: 2019-01-01

## 2019-01-01 RX ADMIN — GLYCOPYRROLATE 0.2 MG: 0.2 INJECTION INTRAMUSCULAR; INTRAVENOUS at 14:41

## 2019-01-01 RX ADMIN — SODIUM CHLORIDE 500 MG: 9 INJECTION, SOLUTION INTRAVENOUS at 00:42

## 2019-01-01 RX ADMIN — MORPHINE SULFATE 4 MG: 4 INJECTION INTRAVENOUS at 10:17

## 2019-01-01 RX ADMIN — MORPHINE SULFATE 4 MG: 4 INJECTION INTRAVENOUS at 18:35

## 2019-01-01 RX ADMIN — ATROPINE SULFATE 2 DROP: 10 SOLUTION OPHTHALMIC at 02:32

## 2019-01-01 RX ADMIN — MORPHINE SULFATE 4 MG: 4 INJECTION INTRAVENOUS at 04:09

## 2019-01-01 RX ADMIN — SODIUM CHLORIDE: 9 INJECTION, SOLUTION INTRAVENOUS at 00:18

## 2019-01-01 RX ADMIN — MORPHINE SULFATE 4 MG: 4 INJECTION INTRAVENOUS at 04:29

## 2019-01-01 RX ADMIN — MORPHINE SULFATE 4 MG: 4 INJECTION INTRAVENOUS at 11:54

## 2019-01-01 RX ADMIN — GLYCOPYRROLATE 0.2 MG: 0.2 INJECTION INTRAMUSCULAR; INTRAVENOUS at 22:58

## 2019-01-01 RX ADMIN — MORPHINE SULFATE 4 MG: 4 INJECTION INTRAVENOUS at 14:09

## 2019-01-01 RX ADMIN — MORPHINE SULFATE 4 MG: 4 INJECTION INTRAVENOUS at 16:23

## 2019-01-01 RX ADMIN — PROPOFOL 20 MCG/KG/MIN: 10 INJECTION, EMULSION INTRAVENOUS at 01:22

## 2019-01-01 RX ADMIN — SODIUM CHLORIDE 3 G: 900 INJECTION INTRAVENOUS at 05:09

## 2019-01-01 RX ADMIN — MORPHINE SULFATE 4 MG: 4 INJECTION INTRAVENOUS at 07:53

## 2019-01-01 RX ADMIN — LORAZEPAM 2 MG: 2 INJECTION INTRAMUSCULAR; INTRAVENOUS at 23:09

## 2019-01-01 RX ADMIN — PROPOFOL 10 MCG/KG/MIN: 10 INJECTION, EMULSION INTRAVENOUS at 08:31

## 2019-01-01 RX ADMIN — LORAZEPAM 2 MG: 2 INJECTION INTRAMUSCULAR; INTRAVENOUS at 22:42

## 2019-01-01 RX ADMIN — MORPHINE SULFATE 4 MG: 4 INJECTION INTRAVENOUS at 14:33

## 2019-01-01 RX ADMIN — SODIUM CHLORIDE 3 G: 900 INJECTION INTRAVENOUS at 01:43

## 2019-01-01 RX ADMIN — LORAZEPAM 2 MG: 2 INJECTION INTRAMUSCULAR; INTRAVENOUS at 06:26

## 2019-01-01 RX ADMIN — SODIUM CHLORIDE 500 ML: 3 INJECTION, SOLUTION INTRAVENOUS at 01:27

## 2019-01-01 RX ADMIN — ACETAMINOPHEN 650 MG: 325 TABLET, FILM COATED ORAL at 10:30

## 2019-01-01 RX ADMIN — SODIUM CHLORIDE 3 G: 900 INJECTION INTRAVENOUS at 12:18

## 2019-01-01 RX ADMIN — FAMOTIDINE 20 MG: 10 INJECTION INTRAVENOUS at 05:09

## 2019-01-01 RX ADMIN — ATROPINE SULFATE 2 DROP: 10 SOLUTION OPHTHALMIC at 04:09

## 2019-01-01 RX ADMIN — SODIUM CHLORIDE: 9 INJECTION, SOLUTION INTRAVENOUS at 12:38

## 2019-01-01 RX ADMIN — LORAZEPAM 2 MG: 2 INJECTION INTRAMUSCULAR; INTRAVENOUS at 05:59

## 2019-01-01 RX ADMIN — MORPHINE SULFATE 4 MG: 4 INJECTION INTRAVENOUS at 08:01

## 2019-01-01 RX ADMIN — MAGNESIUM SULFATE IN WATER 2 G: 40 INJECTION, SOLUTION INTRAVENOUS at 08:32

## 2019-01-01 RX ADMIN — LORAZEPAM 2 MG: 2 SOLUTION, CONCENTRATE ORAL at 21:53

## 2019-01-01 RX ADMIN — MORPHINE SULFATE 20 MG: 100 SOLUTION ORAL at 00:32

## 2019-01-01 RX ADMIN — PROPOFOL 10 MCG/KG/MIN: 10 INJECTION, EMULSION INTRAVENOUS at 21:35

## 2019-01-01 RX ADMIN — SODIUM CHLORIDE 500 MG: 9 INJECTION, SOLUTION INTRAVENOUS at 06:01

## 2019-01-01 RX ADMIN — LORAZEPAM 2 MG: 2 INJECTION INTRAMUSCULAR; INTRAVENOUS at 17:12

## 2019-01-01 RX ADMIN — MORPHINE SULFATE 4 MG: 4 INJECTION INTRAVENOUS at 14:08

## 2019-01-01 RX ADMIN — LORAZEPAM 2 MG: 2 INJECTION INTRAMUSCULAR; INTRAVENOUS at 04:09

## 2019-01-01 RX ADMIN — MORPHINE SULFATE 20 MG: 100 SOLUTION ORAL at 02:32

## 2019-01-01 RX ADMIN — LABETALOL HYDROCHLORIDE 10 MG: 5 INJECTION, SOLUTION INTRAVENOUS at 12:15

## 2019-01-01 RX ADMIN — MORPHINE SULFATE 20 MG: 100 SOLUTION ORAL at 06:14

## 2019-01-01 RX ADMIN — MORPHINE SULFATE 4 MG: 4 INJECTION INTRAVENOUS at 00:06

## 2019-01-01 RX ADMIN — IOHEXOL 120 ML: 350 INJECTION, SOLUTION INTRAVENOUS at 21:14

## 2019-01-01 RX ADMIN — MORPHINE SULFATE 4 MG: 4 INJECTION INTRAVENOUS at 17:12

## 2019-01-01 RX ADMIN — MORPHINE SULFATE 4 MG: 4 INJECTION INTRAVENOUS at 22:42

## 2019-01-01 RX ADMIN — MORPHINE SULFATE 4 MG: 4 INJECTION INTRAVENOUS at 02:06

## 2019-01-01 RX ADMIN — LORAZEPAM 2 MG: 2 INJECTION INTRAMUSCULAR; INTRAVENOUS at 06:14

## 2019-01-01 RX ADMIN — LORAZEPAM 2 MG: 2 INJECTION INTRAMUSCULAR; INTRAVENOUS at 14:19

## 2019-01-01 RX ADMIN — MORPHINE SULFATE 4 MG: 4 INJECTION INTRAVENOUS at 22:58

## 2019-01-01 RX ADMIN — HYDRALAZINE HYDROCHLORIDE 20 MG: 20 INJECTION INTRAMUSCULAR; INTRAVENOUS at 10:37

## 2019-01-01 RX ADMIN — MORPHINE SULFATE 4 MG: 4 INJECTION INTRAVENOUS at 21:45

## 2019-01-01 RX ADMIN — SODIUM CHLORIDE 10 MG/HR: 9 INJECTION, SOLUTION INTRAVENOUS at 00:03

## 2019-01-01 RX ADMIN — SODIUM CHLORIDE 10 MG/HR: 9 INJECTION, SOLUTION INTRAVENOUS at 22:47

## 2019-01-01 RX ADMIN — ATROPINE SULFATE 2 DROP: 10 SOLUTION OPHTHALMIC at 22:42

## 2019-01-01 RX ADMIN — MORPHINE SULFATE 10 MG: 100 SOLUTION ORAL at 06:27

## 2019-01-01 RX ADMIN — LORAZEPAM 2 MG: 2 INJECTION INTRAMUSCULAR; INTRAVENOUS at 14:09

## 2019-01-01 RX ADMIN — MORPHINE SULFATE 4 MG: 4 INJECTION INTRAVENOUS at 00:20

## 2019-01-01 RX ADMIN — LORAZEPAM 2 MG: 2 INJECTION INTRAMUSCULAR; INTRAVENOUS at 21:45

## 2019-01-01 RX ADMIN — MORPHINE SULFATE 4 MG: 4 INJECTION INTRAVENOUS at 12:34

## 2019-01-01 RX ADMIN — MORPHINE SULFATE 4 MG: 4 INJECTION INTRAVENOUS at 10:07

## 2019-01-01 RX ADMIN — MORPHINE SULFATE 4 MG: 4 INJECTION INTRAVENOUS at 06:54

## 2019-01-01 RX ADMIN — MORPHINE SULFATE 20 MG: 100 SOLUTION ORAL at 21:53

## 2019-01-01 RX ADMIN — GLYCOPYRROLATE 0.2 MG: 0.2 INJECTION INTRAMUSCULAR; INTRAVENOUS at 06:16

## 2019-01-01 RX ADMIN — POTASSIUM CHLORIDE 40 MEQ: 1.5 POWDER, FOR SOLUTION ORAL at 08:32

## 2019-01-01 RX ADMIN — LORAZEPAM 2 MG: 2 INJECTION INTRAMUSCULAR; INTRAVENOUS at 02:05

## 2019-01-01 RX ADMIN — MORPHINE SULFATE 4 MG: 4 INJECTION INTRAVENOUS at 10:05

## 2019-01-01 ASSESSMENT — ENCOUNTER SYMPTOMS
DIZZINESS: 0
TINGLING: 0
CHILLS: 0
HEADACHES: 0
SENSORY CHANGE: 0
FEVER: 0
COUGH: 0
TREMORS: 0
SPEECH CHANGE: 0
INSOMNIA: 1
SEIZURES: 0
BLURRED VISION: 0
FOCAL WEAKNESS: 0
DOUBLE VISION: 0
SHORTNESS OF BREATH: 0
PALPITATIONS: 0
LOSS OF CONSCIOUSNESS: 0

## 2019-02-07 NOTE — PROGRESS NOTES
Subjective:      Kailash Haile is a 78 y.o. male who presents with UTI (constantly urinating ); Insomnia (taking melotin but its not helping sleep at all); and Hip Pain (left side hip pain due to his surgery but he can take tylenol x 1 year)            HPI  Kailash Haile is a 78 y.o. male who presents with urinary frequence; Insomnia (taking melotin but its not helping sleep at all); and Hip Pain (left side hip pain due to his surgery-needs Rx of tyenol)    Review of Systems   Constitutional: Negative for chills and fever.   Eyes: Negative for blurred vision and double vision.   Respiratory: Negative for cough and shortness of breath.    Cardiovascular: Negative for chest pain and palpitations.   Genitourinary: Positive for frequency.   Musculoskeletal:        Left hip pain     Skin: Negative for rash.   Neurological: Negative for dizziness, tingling, tremors, sensory change, speech change, focal weakness, seizures, loss of consciousness and headaches.   Psychiatric/Behavioral: The patient has insomnia.    All other systems reviewed and are negative.    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  MEDS:   Current Outpatient Prescriptions:   •  acetaminophen (TYLENOL) 500 MG Tab, Take 2 Tabs by mouth every 6 hours as needed for up to 10 days., Disp: 60 Tab, Rfl: 0  •  Cholecalciferol (VITAMIN D) 2000 UNIT Tab, TAKE 1 TABLET BY MOUTH ONCE DAILY., Disp: 90 Tab, Rfl: 3  •  celecoxib (CELEBREX) 200 MG Cap, Take 1 Cap by mouth every day., Disp: 90 Cap, Rfl: 3  •  lisinopril (PRINIVIL) 20 MG Tab, Take 1 Tab by mouth  every day., Disp: 90 Tab, Rfl: 3  •  simvastatin (ZOCOR) 20 MG Tab, Take 1 Tab by mouth every evening., Disp: 90 Tab, Rfl: 3  •  mirtazapine (REMERON) 15 MG Tab, Take 1 Tab by mouth every bedtime., Disp: 90 Tab, Rfl: 3  •  clopidogrel (PLAVIX) 75 MG Tab, Take 1 Tab by mouth every day., Disp: 90 Tab, Rfl: 3  •  tamsulosin (FLOMAX) 0.4 MG capsule, Take 1 Cap by mouth ONE-HALF HOUR AFTER BREAKFAST., Disp: 90 Cap, Rfl: 3  •  Memantine HCl ER (NAMENDA XR) 28 MG CAPSULE SR 24 HR, Take 1 Cap by mouth every day., Disp: 30 Cap, Rfl: 11  •  donepezil (ARICEPT) 10 MG tablet, TAKE 1 TABLET BY MOUTH ONCE DAILY., Disp: 30 Tab, Rfl: 11  •  melatonin 3 MG Tab, Take 3 mg by mouth every bedtime., Disp: , Rfl:   •  Diclofenac Sodium (VOLTAREN) 1 % Gel, Apply 1 Inch to skin as directed 4 times a day., Disp: 5 Tube, Rfl: 6  ALLERGIES:   Allergies   Allergen Reactions   • Zolpidem Unspecified     Daytime confusion       SURGHX:   Past Surgical History:   Procedure Laterality Date   • HIP ARTHROPLASTY TOTAL Left 5/24/2018    Procedure: HIP ARTHROPLASTY TOTAL- CONVERSION POSTERIOR ;  Surgeon: Luca Brewster M.D.;  Location: [facility];  Service: Orthopedics   • CATARACT PHACO WITH IOL  4/21/2015    Performed by Matthew Robles M.D. at [facility]   • CATARACT PHACO WITH IOL  4/7/2015    Performed by Matthew Robles M.D. at [facility]   • HIP HEMIARTHROPLASTY  7/1/2014    Performed by Devon Horowitz M.D. at [facility]   • MASS EXCISION GENERAL  8/21/2013    Performed by Avila Maya M.D. at [facility]   • OTHER Right     right testicle removed as a baby   • OTHER SURGICAL PROCEDURE  1990's    ear lobe surgery     SOCHX:  reports that he quit smoking about 10 years ago. He has a 45.00 pack-year smoking history. He has never used smokeless tobacco. He reports that he drinks about 0.5 oz of alcohol per week . He reports that he does not use drugs.  FH: Family  history was reviewed, no pertinent findings to report  Medications, Allergies, and current problem list reviewed today in Epic       Objective:     /84   Pulse 84   Temp 36.3 °C (97.3 °F) (Temporal)   Resp 16   Ht 1.829 m (6')   SpO2 97%   BMI 22.93 kg/m²      Physical Exam   Constitutional: He is oriented to person, place, and time. He appears well-developed and well-nourished.   HENT:   Head: Normocephalic and atraumatic.   Right Ear: External ear normal.   Left Ear: External ear normal.   Nose: Nose normal.   Mouth/Throat: Oropharynx is clear and moist.   Neck: Normal range of motion. Neck supple.   Cardiovascular: Normal rate, regular rhythm and normal heart sounds.    Pulmonary/Chest: Effort normal and breath sounds normal.   Abdominal: Soft.   Musculoskeletal: He exhibits tenderness.   Right hip tenderness.  No CVA tenderness present.   Neurological: He is alert and oriented to person, place, and time.   Skin: Skin is warm and dry.   Psychiatric: He has a normal mood and affect. His behavior is normal. Judgment and thought content normal.   Vitals reviewed.              Assessment/Plan:   Patient is a 78-year-old male who presents with 3 different problems.  #1 frequency with urination but denies any pain.  #2 left hip pain due to left hip surgery several months ago.  #3 insomnia which is a chronic problem.  Patient has a past medical history of Alzheimer's.  UA is unremarkable.  We will culture for further evaluation.  Patient's caretaker is with him during the exam.  I explained to her that his insomnia should be brought to the attention of his primary care.  Due to his age and comorbidities I am not comfortable prescribing nighttime medication.  1. Pain of left hip joint    - acetaminophen (TYLENOL) 500 MG Tab; Take 2 Tabs by mouth every 6 hours as needed for up to 10 days.  Dispense: 60 Tab; Refill: 0    2. Frequent urination    - POCT Urinalysis  - URINE CULTURE(NEW); Future    3. Insomnia,  unspecified type  - Follow up with PCP    Differential diagnosis, natural history, supportive care discussed. Follow-up with primary care provider within 7-10 days, emergency room precautions discussed.  Patient and/or family appears understanding of information.  Handout and review of patients diagnosis and treatment was discussed extensively.

## 2019-04-16 NOTE — PROGRESS NOTES
1. HealthConnect Verified: yes    2. Verify PCP: yes    3. Review and add  to Care Team: yes    4. WebIZ Checked & Epic Updated: Yes  WebIZ Recommendations: TD and SHINGRIX (Shingles)  Is patient due for Tdap? NO  Is patient due for Shingles? YES. Patient was not notified of copay/out of pocket cost.    5. Reviewed/Updated the following with patient:       •   Communication Preference Obtained? NO // Tried to contact  patient, to introduce myself as his SCP . Spoke with Bernie (pt's guardian of his state) and she stated that his mobility is limited and that they already provide transportation, but they will call back if is needed.  • [employer] Activation: already active       •   E-Mail Address Obtained? NO       •   Appointment Day and Time Preferences? NO       •   Preferred Pharmacy? NO       •   Preferred Lab? NO    6. Care Gap Scheduling (Attempt to Schedule EACH Overdue Care Gap!)

## 2019-04-26 PROBLEM — I61.9 ACUTE SPONTANEOUS INTRAPARENCHYMAL INTRACRANIAL HEMORRHAGE ASSOCIATED WITH HYPERTENSION (HCC): Status: ACTIVE | Noted: 2019-01-01

## 2019-04-26 PROBLEM — I10 ACUTE SPONTANEOUS INTRAPARENCHYMAL INTRACRANIAL HEMORRHAGE ASSOCIATED WITH HYPERTENSION (HCC): Status: ACTIVE | Noted: 2019-01-01

## 2019-04-27 PROBLEM — T17.908A ASPIRATION INTO AIRWAY: Status: ACTIVE | Noted: 2019-01-01

## 2019-04-27 PROBLEM — S00.01XA SCALP ABRASION: Status: ACTIVE | Noted: 2019-01-01

## 2019-04-27 PROBLEM — J96.01 ACUTE HYPOXEMIC RESPIRATORY FAILURE (HCC): Status: ACTIVE | Noted: 2019-01-01

## 2019-04-27 NOTE — ASSESSMENT & PLAN NOTE
Status post bronchoscopy 4/26 for diagnostic and therapeutic purposes, cultures pending  Unasyn intravenous antibiotic therapy for aspiration pneumonia  Continue aggressive pulmonary toilet, repeat bronchoscopy as needed  Serial chest x-ray  Follow-up respiratory cultures

## 2019-04-27 NOTE — ASSESSMENT & PLAN NOTE
Large intraparenchymal hemorrhage  Comfort care  Prognosis is grim given size and location of bleed.  Unable to get a hold of family to discuss GOC.

## 2019-04-27 NOTE — PROGRESS NOTES
· 2 RN skin check complete with KIM Whittington.  · Devices in place:  · ETT, Deven, OG, ECG leads, R SC triple lumen, BP cuff, pulse oximeter, 2 PIV, R radial arterial line, L soft wrist restraint, Ann catheter, stat lock, SCDs  · Skin assessed under devices listed above.  · WOCN previously placed for toenails, skin issues including eczema, skin tear, fragile skin.  · The following interventions in place:  · Turning and repositioning q2h with pillows  · BP cuff/pulse oximeter alternated  · Heels floated

## 2019-04-27 NOTE — CARE PLAN
Problem: Ventilation Defect:  Goal: Ability to achieve and maintain unassisted ventilation or tolerate decreased levels of ventilator support  Outcome: PROGRESSING SLOWER THAN EXPECTED  Adult Ventilation Update    Total Vent Days: 2    CMV, 18, 470, +8, 40%    Patient Lines/Drains/Airways Status    Active Airway     Name: Placement date: Placement time: Site: Days:    Airway ETT Oral 8.0 04/26/19   2135   Oral   2                     Sputum/Suction   Cough: Productive (04/27/19 0000)  Sputum Amount: Moderate (04/27/19 0000)  Sputum Color: Brown (04/27/19 0000)  Sputum Consistency: Thick (04/27/19 0000)    Mobility  Level of Mobility: Level II (04/27/19 0000)  Activity Performed: Unable to mobilize (04/27/19 0000)  Pt Calls for Assistance: No (04/27/19 0000)  Staff Present for Mobilization: RN (04/27/19 0000)  Gait: Unable to Ambulate (04/27/19 0200)  Reason Not Mobilized: Unstable condition (04/27/19 0000)  Mobilization Comments: large CVA bleed. strict BP monitoring (04/27/19 0000)    Events/Summary/Plan: Pt. bronched at this time with MD Allison. (04/27/19 0015)

## 2019-04-27 NOTE — PROGRESS NOTES
MAINOR returned page at [phone]. Spoke with legal guardianJerri at [phone]. They are only the legal guardian of the estate/finances. To their knowledge, the patient has a cousin in the [place]: name of Vera Benz, email: [email or web address] and another person in the [place] (unknown relationship to patient) Karyna Guzmán with email: [email or web address]    MAINOR working on finding NOK/POA.

## 2019-04-27 NOTE — PROCEDURES
Procedures  Procedure Note    Date: 4/27/2019  Time: 12:30    Procedure: Bronchoscopy    Indication: aspiration    Consent: Unable to obtain informed consent obtained from patient or designated decision maker.    Procedure: A time-out was performed. Respiratory therapy and nursing at bedside throughout procedure. Patient provided sedation and analgesia throughout the procedure. Placed on full ventilator support with an FiO2 of 100% throughout the procedure. Using a fiberoptic bronchoscope, trachea entered via ETT.  10 mL of local anesthetic sprayed at the sheree (2% lidocaine) achieving appropriate comfort level for patient. Airways visualized directly and the following intervention was performed: Suctioning of aspirate and BAL. Findings as below. Patient tolerated procedure well without any difficulties and left in care of bedside nurse/RT.     Medications: Propofol, lidocaine  Findings: Upper airway - Not visualized as bronchoscope passed through ETT.        Trachea to sheree -normal appearing mucosa without lesions or mass, ETT tip measured 2 cm from the sheree.        R proximal and distal airways -normal appearing mucosa without mass/lesion/anatomic variance, secretions: Copious amounts of aspirate present in the right mainstem, right lower lobe bronchi and right middle lobe bronchi.  The airways were sequentially lavaged until clear.  Right lower lobe BAL obtained        L proximal and distal airways -normal appearing mucosa without mass/lesion/anatomic variance, secretions: Moderate amount of aspirate in the left mainstem, left lower lobe bronchi.  All airways were sequentially lavaged to clear        Samples -right lower lobe BAL    Complications: None apparent  CXR (if applicable): Pending    Kailash Allison DO  Critical Care Medicine

## 2019-04-27 NOTE — ED TRIAGE NOTES
Kailash Haile  78 y.o. male  Chief Complaint   Patient presents with   • ALOC     Last known well @ 2000     Pt BIB [facility] for a possible stroke, last known well @ 2000, pt presents ALOC. Pt also presented with aspiration due to vomiting while laying flat. Pt brought to CT immediately upon arrival. ERP at bedside for intubation in ED 07. Pt given 10mg etomidate at 2117 and 100mg Succ @ 2117 prior to intubation. 8.0 Tube secured at 25. RT also at bedside.

## 2019-04-27 NOTE — ASSESSMENT & PLAN NOTE
Left basal ganglia with intraventricular extension  ICH score 3: Calculated 72% mortality  Keep head of bed elevated greater than 30 degrees, limit PEEP as tolerated  History of Plavix, TEG with platelet mapping actually okay, no additional blood products at this time  Maintain SBP <140 mmHg.  As needed IV hydralazine or IV labetalol  Titrate nicardipine continuous infusion intravenously for hypertension as needed  3% saline hypertonic therapy, goal 150  Neurosurgical consultation reviewed, no surgical intervention at this time  Day  who reaching out to state appointed guardian again for further clarification of goals of care and CODE STATUS if possible as well as to help facilitate tracking down possible family  Palliative care consultation requested as well for the same reason

## 2019-04-27 NOTE — DISCHARGE PLANNING
Aware of PMR referral from Dr. Hooks. 78-year-old with history of dementia, BIB [facility] from care facility. Large parenchymal hemorrhage in the left basal ganglia. Intubated. Ongoing efforts to reach family. No Physiatry consult ordered per protocol at this time. [facility] will follow for plan of care. Thank you for the referral.

## 2019-04-27 NOTE — PROCEDURES
"Arterial Line Insertion  Date/Time: 4/27/2019 1:14 AM  Performed by: SIDDHARTHA CHAVEZ JR  Authorized by: SIDDHARTHA CHAVEZ JR   Consent: The procedure was performed in an emergent situation.  Time out: Immediately prior to procedure a \"time out\" was called to verify the correct patient, procedure, equipment, support staff and site/side marked as required.  Preparation: Patient was prepped and draped in the usual sterile fashion.  Indications: multiple ABGs, respiratory failure and hemodynamic monitoring  Location: right radial  Nader's test normal: yes  Needle gauge: 20  Seldinger technique: Seldinger technique used  Number of attempts: 1  Post-procedure: line sutured and dressing applied  Post-procedure CMS: normal  Patient tolerance: Patient tolerated the procedure well with no immediate complications          "

## 2019-04-27 NOTE — PROGRESS NOTES
Critical Care Progress Note    Date of admission  4/26/2019    Chief Complaint  78 y.o. male admitted 4/26/2019 with     Hospital Course    78 y.o. 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  Per reports the patient was found at his home on the ground with his head against the wall.  He was last seen normal around 6 PM.  He was noted to have right hemiplegia upon EMS arrival, and had copious emesis with obvious aspirate.  Patient was intubated following CT in the ER.  Neuroimaging well in the ER showed a 4.4 x 5.2 x 5.7 cm left basal ganglia hemorrhage extending into the lateral ventricles, third and fourth ventricles with associated 7 mm left-to-right shift.  CTA suggested active hemorrhage. Neurology was consulted in the ER and recommended strict blood pressure control, correction of coagulopathy, hypertonic saline and neurosurgical evaluation.  Neurosurgery has seen the patient and determined that there is no role for neurosurgical interventions in the setting and recommended palliative/comfort care.      Interval Problem Update  Reviewed last 24 hour events:    Unresponsive  Focal exam, some brainstem findings  Sz now?  SR 70s  SBp 140s  Nicardipine infusion  UO adequate  Vent day#2  PEEP 8, FiO2 40%  CXR clear  Secretions min  3% saline 30 ml  Prop 10  NS 80  T-max 100.9  White blood cell count 26.7  Unasyn  CT with massive bleed, reviewed axial images with hospitalist  NS recommending CC    Mg/K replete  CM -> find family    Serial follow-up, patient intermittently having possible seizure activity propofol being adjusted  Patient still not able to respond or follow in any fashion  Blood pressure remains labile  Social work/ working to track down family  They reached out to guardian again, state appointed  apparently, for physical purposes only apparently which is odd with this patient with known history of dementia, unable to reach any family whatsoever and apparently no contact phone numbers    Review of Systems  Review of Systems   Unable to perform ROS: Intubated        Vital Signs for last 24 hours   Temp:  [36.8 °C (98.2 °F)-37.5 °C (99.5 °F)] 37 °C (98.6 °F)  Pulse:  [] 93  Resp:  [17-66] 35  SpO2:  [85 %-99 %] 91 %    Hemodynamic parameters for last 24 hours       Respiratory Information for the last 24 hours  [place] Vent Mode: APVCMV  Rate (breaths/min): 18  Vt Target (mL): 470  PEEP/CPAP: 8  FiO2: 3  P MEAN: 11  Control VTE (exp VT): 468    Physical Exam   Physical Exam   Constitutional: He has a sickly appearance. No distress. He is intubated.   HENT:   Head: Normocephalic and atraumatic.   Mouth/Throat: Mucous membranes are not dry and not cyanotic.   Eyes: No scleral icterus.   Neck: Neck supple. Normal carotid pulses and no JVD present.   Cardiovascular: Normal rate and intact distal pulses.   Occasional extrasystoles are present. Exam reveals no gallop.    No murmur heard.  Pulmonary/Chest: No accessory muscle usage. He is intubated. He has decreased breath sounds in the right lower field and the left lower field. He has no wheezes. He has rhonchi. He has no rales.   Abdominal: Soft. He exhibits no distension. Bowel sounds are decreased. There is no hepatosplenomegaly. There is no guarding.   Musculoskeletal: He exhibits no edema.   Neurological: He is unresponsive. A cranial nerve deficit is present. He exhibits abnormal muscle tone. He displays seizure activity (Indeterminate). GCS eye subscore is 1. GCS verbal subscore is 1. GCS motor subscore is 1.   Disconjugate with downward inward gaze, small pupils, occasional ocular movements laterally, minimal spontaneous movements, indeterminant plantar reflexes, does not withdraw to tactile stimuli, no gag, no cough, no corneal   Skin: Skin is warm.  He is diaphoretic. No cyanosis or erythema. Nails show no clubbing.   Psychiatric:   Unable to assess       Medications  Current Facility-Administered Medications   Medication Dose Route Frequency Provider Last Rate Last Dose   • acetaminophen (TYLENOL) tablet 650 mg  650 mg Enteral Tube Q4HRS PRN Chin Yu D.O.        Or   • acetaminophen (TYLENOL) suppository 650 mg  650 mg Rectal Q4HRS PRN MARYLIN Cid.O.       • morphine (pf) 4 mg/ml injection 4 mg  4 mg Intravenous Q10 MIN PRN Chin Yu D.O.   4 mg at 04/27/19 2145   • LORazepam (ATIVAN) injection 2 mg  2 mg Intravenous Q10 MIN PRN MARYLIN Cid.O.   2 mg at 04/27/19 2145   • ondansetron (ZOFRAN ODT) dispertab 4 mg  4 mg Oral Q4HRS PRN Kat Hooks M.D.        Or   • ondansetron (ZOFRAN) syringe/vial injection 4 mg  4 mg Intravenous Q4HRS PRN Kat Hooks M.D.           Fluids    Intake/Output Summary (Last 24 hours) at 04/27/19 2231  Last data filed at 04/27/19 2000   Gross per 24 hour   Intake          2398.55 ml   Output             1165 ml   Net          1233.55 ml       Laboratory  Recent Labs      04/26/19 2233  04/27/19   0443   ISTATAPH  7.384*  7.377*   ISTATAPCO2  33.7  34.0   ISTATAPO2  130*  104*   ISTATATCO2  21  21   [id number]  99  98   ISTATARTHCO3  20.1  20.0   ISTATARTBE  -4  -4   ISTATTEMP  see below  100.4 F   ISTATFIO2  60  40   ISTATSPEC  Arterial  Arterial   ISTATAPHTC   --   7.363*   [id number]   --   111*     Recent Labs      04/26/19   2101   TROPONINI  0.02     Recent Labs      04/27/19   0010  04/27/19   0436  04/27/19   1031   SODIUM  142  139  145   POTASSIUM  3.6  3.7  5.0   CHLORIDE  111  112  117*   CO2  20  22  22   BUN  18  20  21   CREATININE  0.64  0.63  0.61   MAGNESIUM   --   1.8   --    CALCIUM  9.3  9.0  8.9     Recent Labs      04/26/19   2101  04/27/19   0010  04/27/19   0436  04/27/19   1031   ALTSGPT  27   --   23   --    ASTSGOT  28   --   30   --     ALKPHOSPHAT  91   --   79   --    TBILIRUBIN  0.7   --   0.7   --    GLUCOSE  122*  200*  177*  140*     Recent Labs      04/26/19   2101  04/27/19   0436   WBC  22.6*  26.7*   NEUTSPOLYS  92.90*  93.80*   LYMPHOCYTES  2.40*  1.90*   MONOCYTES  4.00  3.40   EOSINOPHILS  0.00  0.00   BASOPHILS  0.20  0.20   ASTSGOT  28  30   ALTSGPT  27  23   ALKPHOSPHAT  91  79   TBILIRUBIN  0.7  0.7     Recent Labs      04/26/19   2101 04/27/19   0436   RBC  4.32*  4.02*   HEMOGLOBIN  13.8*  12.7*   HEMATOCRIT  41.5*  38.3*   PLATELETCT  275  263   PROTHROMBTM  14.1   --    APTT  30.3   --    INR  1.07   --        Imaging  X-Ray:  I have personally reviewed the images and compared with prior images.  EKG:  I have personally reviewed the images and compared with prior images.  CT:    Reviewed  Echo:   Reviewed    Assessment/Plan  * Acute spontaneous intraparenchymal intracranial hemorrhage associated with hypertension (HCC)- (present on admission)   Assessment & Plan    Left basal ganglia with intraventricular extension  ICH score 3: Calculated 72% mortality  Keep head of bed elevated greater than 30 degrees, limit PEEP as tolerated  History of Plavix, TEG with platelet mapping actually okay, no additional blood products at this time  Maintain SBP <140 mmHg.  As needed IV hydralazine or IV labetalol  Titrate nicardipine continuous infusion intravenously for hypertension as needed  3% saline hypertonic therapy, goal 150  Neurosurgical consultation reviewed, no surgical intervention at this time  Day  who reaching out to state appointed guardian again for further clarification of goals of care and CODE STATUS if possible as well as to help facilitate tracking down possible family  Palliative care consultation requested as well for the same reason     Scalp abrasion- (present on admission)   Assessment & Plan    Wound care     Aspiration into airway   Assessment & Plan    Status post bronchoscopy 4/26 for diagnostic and  therapeutic purposes, cultures pending  Unasyn intravenous antibiotic therapy for aspiration pneumonia  Continue aggressive pulmonary toilet, repeat bronchoscopy as needed  Serial chest x-ray  Follow-up respiratory cultures     Acute hypoxemic respiratory failure (HCC)   Assessment & Plan    Intubated in ER on 4/26  RT/O2 protocols  Ventilator bundle  Serial ABG/chest x-ray/ventilator mechanics review  Sedation as tolerated/indicated, currently titrating propofol  SBT when clinically appropriate  Unlikely to be a candidate for SAT or SBT given large intraparenchymal hemorrhage     Chronic obstructive pulmonary disease (HCC)- (present on admission)   Assessment & Plan    History of  Not in current acute exacerbation  RT/O2 Protocols  DuoNeb 4 times daily and as needed  No role for steroids at this time  Titrate supplemental FiO2 to maintain SpO2 >88%     Dementia associated with other underlying disease without behavioral disturbance- (present on admission)   Assessment & Plan    On Remeron, Namenda and Aricept at nursing home  Reportedly makes his own medical decisions per public guardians office?  Obtain further history  Monitor     Pulmonary hypertension (HCC)- (present on admission)   Assessment & Plan    RVSP 30  Judicious IV fluid use  Monitor     Essential hypertension- (present on admission)   Assessment & Plan    Requiring nicardipine infusion and titration  As needed hydralazine and labetalol IV for blood pressure control  This is likely the etiology of his hemorrhage  Goal systolic blood pressure less than 140     CAD (coronary artery disease)- (present on admission)   Assessment & Plan    On Plavix at home  TEG with mapping actually okay, no significant platelet inhibition  Monitor          VTE:  Contraindicated  Ulcer: H2 Antagonist  Lines: Central Line  Ongoing indication addressed, Arterial Line  Ongoing indication addressed and Ann Catheter  Ongoing indication addressed    I have performed a  physical exam and reviewed and updated ROS and Plan today (4/27/2019). In review of yesterday's note (4/26/2019), there are no changes except as documented above.     Patient is critically ill.  Patient suffered a severe intracranial hemorrhage.  Patient on full support the ventilator and appropriate for liberation trials.  Patient received intermittent medication to control blood pressure.  Patient being sedated with propofol which actually helped a low blood pressure and may be helping suppress possible seizure activity.  IV lorazepam will be mentioned for active seizures.  Keppra for active seizure treatment.  IV nicardipine infusion for hypertension.  Prognosis poor, social work/ attempting to track down family to review goals of care.  Patient is critically ill and has marked increased morbidity mortality without above critical care interventions.    Discussed patient condition and risk of morbidity and/or mortality with Hospitalist, RN, RT, Pharmacy, Charge nurse / hot rounds and neurology  The patient remains critically ill.  Critical care time = 40 minutes in directly providing and coordinating critical care and extensive data review.  No time overlap and excludes procedures.

## 2019-04-27 NOTE — ASSESSMENT & PLAN NOTE
On Remeron, Namenda and Aricept at nursing home  Reportedly makes his own medical decisions per public guardians office?  Obtain further history  Monitor

## 2019-04-27 NOTE — PROGRESS NOTES
Hospital Medicine Daily Progress Note    Date of Service  4/27/2019    Chief Complaint  78 y.o. male admitted 4/26/2019 with altered mental status    Hospital Course    Hx CAD, dementia, hypohtyroidism, HTN, HLD.  Found down at a care facility and intubated for airway protection.  On presentation to ED STAT head CT showing large ICH      Interval Problem Update  ROS unobtainable due to pt's mental status  OG to suction  Sinus 70s  SBP 120s  Off nicardipine  BM PTA  UOP adequate  100.8  3% 30ml/hr  Prop 10mg/hr    ADDENDUM:  Dw  who have been unable to find or contact family/next of kin.  Given pt's grim prognosis and exceedingly small chance of any meaningful quality of life and after discussion with Dr Melgar we will transition to comfort care status as it is what is most appropriate for this gentelman    Orders writtent    Consultants/Specialty  Neurology  Neuro Surgery  Pulmonology    Code Status  full    Disposition  Critically ill prognosis is grim    Review of Systems  Review of Systems   Unable to perform ROS: Mental status change        Physical Exam  Temp:  [36.8 °C (98.2 °F)-37.5 °C (99.5 °F)] 36.8 °C (98.2 °F)  Pulse:  [] 78  Resp:  [17-51] 21  BP: (163)/(85) 163/85  SpO2:  [90 %-100 %] 95 %    Physical Exam   Constitutional: He appears well-developed and well-nourished. No distress.   HENT:   Head: Normocephalic and atraumatic.   Nose: Nose normal.   Mouth/Throat: Oropharynx is clear and moist.   Eyes: Conjunctivae are normal.   Neck: No JVD present.   Cardiovascular: Normal rate.  Exam reveals no gallop.    No murmur heard.  Pulmonary/Chest: No stridor. No respiratory distress. He has no wheezes. He has rales.   Abdominal: Soft. There is no tenderness. There is no rebound and no guarding.   Musculoskeletal: He exhibits no edema.   Neurological:   No response to pain.  Gaze fixed downward +nystagmus.  No gag or corneal   Skin: Skin is warm and dry. No rash noted. He is not  diaphoretic.   Nursing note and vitals reviewed.      Fluids    Intake/Output Summary (Last 24 hours) at 04/27/19 0524  Last data filed at 04/27/19 0400   Gross per 24 hour   Intake           837.54 ml   Output              450 ml   Net           387.54 ml       Laboratory  Recent Labs      04/26/19 2101 04/27/19   0436   WBC  22.6*  26.7*   RBC  4.32*  4.02*   HEMOGLOBIN  13.8*  12.7*   HEMATOCRIT  41.5*  38.3*   MCV  96.1  95.3   MCH  31.9  31.6   MCHC  33.3*  33.2*   RDW  48.9  48.6   PLATELETCT  275  263   MPV  10.4  10.2     Recent Labs      04/26/19 2101 04/27/19   0010  04/27/19   0436   SODIUM  143  142  139   POTASSIUM  3.8  3.6  3.7   CHLORIDE  109  111  112   CO2  22  20  22   GLUCOSE  122*  200*  177*   BUN  20  18  20   CREATININE  0.63  0.64  0.63   CALCIUM  9.9  9.3  9.0     Recent Labs      04/26/19 2101   APTT  30.3   INR  1.07         Recent Labs      04/26/19 2101 04/27/19 0436   TRIGLYCERIDE  44  44   HDL  60   --    LDL  74   --        Imaging  DX-CHEST-FOR LINE PLACEMENT Perform procedure in: Patient's Room   Final Result            1. A right peripherally inserted catheter with tip projects appropriately over the expected area of the superior vena cava.      DX-CHEST-PORTABLE (1 VIEW)   Final Result         1. Intubated. No pulmonary infiltrates or consolidations are noted.      DX-ABDOMEN FOR TUBE PLACEMENT   Final Result         Feeding tube with tip projecting over the expected area of the stomach fundus.      CT-CTA HEAD WITH & W/O-POST PROCESS   Final Result         1. There is active hemorrhage within the large parenchymal hematoma in the left basal ganglia.         CRITICAL RESULT READ BACK: Preliminary findings discussed with and critical read back performed by Dr. BRYAN RODRIGUEZ in the Emergency Department via telephone on 4/26/2019 9:22 PM      CT-HEAD W/O   Final Result         1. Large parenchymal hemorrhage in the left basal ganglia extending to the left lateral  ventricle and right lateral ventricle.      2. Dilated lateral ventricle, concerning for obstructive hydrocephalus.      3. Left to right midline shift of 7 mm.         CRITICAL RESULT READ BACK: Preliminary findings discussed with and critical read back performed by Dr. BRYAN RODRIGUEZ in the Emergency Department via telephone on 4/26/2019 9:15 PM      CT-CTA NECK WITH & W/O-POST PROCESSING   Final Result      1. No evidence of flow-limiting stenosis in the cervical carotid or cervical vertebral arteries.      CT-CEREBRAL PERFUSION ANALYSIS   Final Result      1.  Cerebral blood flow less than 30% = 82 mL.      2.  T Max more than 6 seconds = 201 mL.      3.  Mismatched volume = 119      4.  These abnormal areas correspond to the hemorrhage seen on CT           Assessment/Plan  * Acute spontaneous intraparenchymal intracranial hemorrhage associated with hypertension (HCC)- (present on admission)   Assessment & Plan    Large intraparenchymal hemorrhage  NSG consulted, no intervention at this time   Neuro consulted  Nicardipine started to keep SBP <140  Neuro checks hourly  PMA consulted   Keppra 500 BID for seizure prophylaxis   Prognosis is grime given size and location of bleed.  Attempting to get a hold of family to discuss GOC.           Scalp abrasion- (present on admission)   Assessment & Plan    Local wound care     Aspiration into airway   Assessment & Plan    On unasyn for now   Follow cultures  descialte therapy as appropriate     Acute hypoxemic respiratory failure (HCC)   Assessment & Plan    Due to altered level of conciousness   Intubated 4/26/2019   PMA consutled     Chronic obstructive pulmonary disease (HCC)- (present on admission)   Assessment & Plan    resp care protocol   Not in acute exacerbation      Dementia associated with other underlying disease without behavioral disturbance- (present on admission)   Assessment & Plan    On namenda and donepizil      Pulmonary hypertension (HCC)- (present on  admission)   Assessment & Plan    History of with rvsp 25-30     Essential hypertension- (present on admission)   Assessment & Plan    Cont to titrate nicardipine to keep SBP<140           VTE prophylaxis: none in setting of acute ICH

## 2019-04-27 NOTE — CONSULTS
"CC:  ICH  Date of Admission: 4/26/2019    Today's Date: 04/27/19    Requesting  Physician: Constantin Lehman M.D      HPI:    Kailash Haile is a 78 y.o. male brought yesterday to ER, after being found with AMS.  Patient has Alzheimer's, HTN, HLD  Currently intubated, no family members have been found yet.  Per Dr Davis yesterday patient initial lachelle was 8, ICH volumen calculated by me is around 130cc ;   ICH score is 3 with a 72% mortality at 30 days.  NSG evaluated and deemed him not surgical mostly due to his previous dementia diagnosis         HISTORIES AS ABOVE ARE INCOMPLETE IF PATIENT IS INTUBATED, OR UNABLE TO COMMUNICATE OR ELUCIDATE.    ROS:   Unable to obtain      Past Medical History:   Past Medical History:   Diagnosis Date   • Personal history of venous thrombosis and embolism 5/2012    stroke   • Stroke (HCC) 5/2012    weakness marquez side (Hx of stroke x2)   • CAD (coronary artery disease) 1990     S/P acute MI   • Cancer (HCC)     skin   • Chronic autoimmune thyroiditis    • Dementia in Alzheimer's disease    • Dental disorder     \"hygiene issues\"   • High cholesterol    • Hypertension    • Hypothyroidism    • MEDICAL HOME    • Melanoma in situ of ear (HCC)    • Myocardial infarct (HCC)     1990   • Pain     left hip   • Pericarditis    • S/P orchiectomy      R / undescended testicle   • Urinary incontinence     occasionally       Past Surgical History:   Past Surgical History:   Procedure Laterality Date   • HIP ARTHROPLASTY TOTAL Left 5/24/2018    Procedure: HIP ARTHROPLASTY TOTAL- CONVERSION POSTERIOR ;  Surgeon: Luca Brewster M.D.;  Location: SURGERY [facility];  Service: Orthopedics   • CATARACT PHACO WITH IOL  4/21/2015    Performed by [place] Rob[place] M.[place] at [place]   • CATARACT PHACO WITH IOL  4/7/2015    Performed by Matthew Robles M.D. at [place]   • HIP HEMIARTHROPLASTY  7/1/2014    Performed by Devon Horowitz M.D. at SURGERY " [facility]   • MASS EXCISION GENERAL  8/21/2013    Performed by Avila Maya M.D. at SURGERY [facility]   • OTHER Right     right testicle removed as a baby   • OTHER SURGICAL PROCEDURE  1990's    ear lobe surgery       Social History:   Social History     Social History   • Marital status:      Spouse name: N/A   • Number of children: N/A   • Years of education: N/A     Occupational History   • Not on file.     Social History Main Topics   • Smoking status: Former Smoker     Packs/day: 1.50     Years: 30.00     Quit date: 1/1/2009   • Smokeless tobacco: Never Used   • Alcohol use 0.5 oz/week     1 Standard drinks or equivalent per week      Comment: once a month   • Drug use: No   • Sexual activity: Not Currently     Other Topics Concern   • Not on file     Social History Narrative   • No narrative on file       Family History:   Family History   Problem Relation Age of Onset   • Other Mother 90        unknown   • Dementia Father    • Hypertension Unknown    • Genetic Neg Hx        Allergies:   Allergies   Allergen Reactions   • Zolpidem Unspecified     Daytime confusion           Current Facility-Administered Medications:   •  Respiratory Care per Protocol, , Nebulization, Continuous RT, Kailash Allison Jr., D.O.  •  famotidine (PEPCID) tablet 20 mg, 20 mg, Oral, Q12HRS **OR** famotidine (PEPCID) injection 20 mg, 20 mg, Intravenous, Q12HRS, Kailash Allison Jr., D.O., 20 mg at 04/27/19 0509  •  senna-docusate (PERICOLACE or SENOKOT S) 8.6-50 MG per tablet 2 Tab, 2 Tab, Oral, BID, Stopped at 04/27/19 0600 **AND** polyethylene glycol/lytes (MIRALAX) PACKET 1 Packet, 1 Packet, Oral, QDAY PRN **AND** magnesium hydroxide (MILK OF MAGNESIA) suspension 30 mL, 30 mL, Oral, QDAY PRN **AND** bisacodyl (DULCOLAX) suppository 10 mg, 10 mg, Rectal, QDAY PRN, Kailash Allison Jr., D.O.  •  MD Alert...ICU Electrolyte Replacement per Pharmacy, , Other, PHARMACY TO DOSE, Kailash Allison Jr., D.O.  •   Pharmacy Consult: Enteral tube insertion - review meds/change route/product selection, , Other, PHARMACY TO DOSE, MARYLIN Alford Jr..CARA.  •  MD Alert...PROPOFOL CRITICAL CARE PROTOCOL 1 Each, 1 Each, Other, PRN, Kailash Allison Jr., D.O.  •  fentaNYL (SUBLIMAZE) injection 25 mcg, 25 mcg, Intravenous, Q HOUR PRN **OR** fentaNYL (SUBLIMAZE) injection 50 mcg, 50 mcg, Intravenous, Q HOUR PRN **OR** fentaNYL (SUBLIMAZE) injection 100 mcg, 100 mcg, Intravenous, Q HOUR PRN, MARYLIN Alford Jr..O.  •  insulin regular (HUMULIN R) injection 2-9 Units, 2-9 Units, Subcutaneous, Q6HRS, Stopped at 04/27/19 0045 **AND** Accu-Chek Q6 if NPO, , , Q6H **AND** NOTIFY MD and PharmD, , , Once **AND** glucose 4 g chewable tablet 16 g, 16 g, Oral, Q15 MIN PRN **AND** dextrose 50% (D50W) injection 25 mL, 25 mL, Intravenous, Q15 MIN PRN, MARYLIN Alford Jr..O.  •  propofol (DIPRIVAN) injection, 0-80 mcg/kg/min, Intravenous, Continuous, Stopped at 04/27/19 1031 **AND** Triglycerides Starting now and then Every 3 Days, , , Every 3 Days (0300), MARYLIN Alford Jr..O.  •  NS infusion, , Intravenous, Continuous, Kat Hooks M.D., Last Rate: 80 mL/hr at 04/27/19 0018  •  LORazepam (ATIVAN) injection 2 mg, 2 mg, Intravenous, Q5 MIN PRN, Kat Hooks M.D.  •  acetaminophen (TYLENOL) tablet 650 mg, 650 mg, Oral, Q4HRS PRN, 650 mg at 04/27/19 1030 **OR** acetaminophen (TYLENOL) suppository 650 mg, 650 mg, Rectal, Q4HRS PRN, Kat Hooks M.D.  •  ondansetron (ZOFRAN ODT) dispertab 4 mg, 4 mg, Oral, Q4HRS PRN **OR** ondansetron (ZOFRAN) syringe/vial injection 4 mg, 4 mg, Intravenous, Q4HRS PRN, Kat Hooks M.D.  •  levETIRAcetam (KEPPRA) 500 mg in  mL IVPB, 500 mg, Intravenous, Q12HRS, Kat Hooks M.D., Stopped at 04/27/19 0616  •  niCARdipine (CARDENE) 25 mg in  mL Infusion, 2.5-15 mg/hr, Intravenous, Continuous, Kailash Allison Jr., D.O., Stopped at 04/27/19 0100  •  norepinephrine (LEVOPHED) 8 mg  in  mL Infusion, 1-30 mcg/min, Intravenous, Continuous, Kailash Allison Jr. D.O., Stopped at 04/27/19 0015  •  3% sodium chloride (HYPERTONIC SALINE) 500mL infusion 500 mL, 500 mL, Intravenous, Continuous, Kailash Allison Jr. D.O., Last Rate: 30 mL/hr at 04/27/19 0127, 500 mL at 04/27/19 0127  •  labetalol (NORMODYNE,TRANDATE) injection 10 mg, 10 mg, Intravenous, Q HOUR PRN, Kailash Allison Jr. D.O.  •  hydrALAZINE (APRESOLINE) injection 20 mg, 20 mg, Intravenous, Q4HRS PRN, MARYLIN Alford Jr..OCody, 20 mg at 04/27/19 1037  •  ampicillin/sulbactam (UNASYN) 3 g in  mL IVPB, 3 g, Intravenous, Q6HRS, MARYLIN Alford Jr..O., Stopped at 04/27/19 0539      PHYSICAL EXAM    Vitals:    04/27/19 0900 04/27/19 0935 04/27/19 1000 04/27/19 1100   BP:       Pulse: 74  74 (!) 101   Resp: 18  18 (!) 66   Temp:   36.8 °C (98.3 °F)    TempSrc:   Temporal    SpO2: 97% 97% 97% 96%   Weight:       Height:           Constitutional: lying in bed, intubated.    Head/Neck: NCAT. no meningismus neg kernig neg brudzinski. No obvious mass or heard bruit. No tender arteries or lost pulses. No rash of head or neck.    Cardiovascular: Regular, rhythmic    Pulmonary: Normal breath sounds    Extremities: the joints are not contracted  , No edema, normal pulses    Skin:eccema     Eyes/Funduscopic: down gaze deviation    Psych:unable     Mental Status: comatose and sedated , mobilizing to pain the left side attempting to withdraw  Salma EO:1 VR:1 MR: 4=6    Cranial Nerves: CN II-XII intact. Pupillary reflex + 4; No afferent pupillary defect.   Eyes deviated downward, with a torsional upgaze nystagmus.      Motor:  Only moves the Left side      Sensory: unable.  Coordination:unable.    DTR's: + 3 left ; +1 R, right side is flaccid ;  no clonus.    Babinski: + in the R     Gait/Station: unable            ICH score:3  [place]: 3-4(2)  5-12(1)  13-15(0)  Age >80:  Infratentorial :  Volume >301  Intraventricular:  1          Labs:  Recent Labs      04/26/19 2101 04/27/19 0436   WBC  22.6*  26.7*   RBC  4.32*  4.02*   HEMOGLOBIN  13.8*  12.7*   HEMATOCRIT  41.5*  38.3*   MCV  96.1  95.3   MCH  31.9  31.6   MCHC  33.3*  33.2*   RDW  48.9  48.6   PLATELETCT  275  263   MPV  10.4  10.2     Recent Labs      04/27/19   0010  04/27/19   0436  04/27/19   1031   SODIUM  142  139  145   POTASSIUM  3.6  3.7  5.0   CHLORIDE  111  112  117*   CO2  20  22  22   GLUCOSE  200*  177*  140*   BUN  18  20  21   CREATININE  0.64  0.63  0.61   CALCIUM  9.3  9.0  8.9     Recent Labs      04/26/19 2101   APTT  30.3   INR  1.07         Recent Labs      04/26/19   2101   TROPONINI  0.02     Recent Labs      04/26/19 2101 04/27/19   0436   TRIGLYCERIDE  44  44   HDL  60   --    LDL  74   --      Recent Labs      04/27/19   0010  04/27/19   0436  04/27/19   1031   SODIUM  142  139  145   POTASSIUM  3.6  3.7  5.0   CHLORIDE  111  112  117*   CO2  20  22  22   GLUCOSE  200*  177*  140*   BUN  18  20  21     Recent Labs      04/27/19   0010  04/27/19   0436  04/27/19   1031   SODIUM  142  139  145   POTASSIUM  3.6  3.7  5.0   CHLORIDE  111  112  117*   CO2  20  22  22   BUN  18  20  21   CREATININE  0.64  0.63  0.61   MAGNESIUM   --   1.8   --    CALCIUM  9.3  9.0  8.9     Recent Labs      04/26/19 2101   APTT  30.3   INR  1.07     Results for orders placed or performed during the hospital encounter of 05/22/12   2-D ECHO WITH BUBBLE STUDY (ECHOCARDIOGRAM COMP W/O CONT)   Result Value Ref Range    Eject.Frac. MOD BP 61.51     Eject.Frac. MOD 4C 61.29     Eject.Frac. MOD 2C 60.82               Imaging: neuroimaging reviewed and directly visualized by me  DX-CHEST-FOR LINE PLACEMENT Perform procedure in: Patient's Room   Final Result            1. A right peripherally inserted catheter with tip projects appropriately over the expected area of the superior vena cava.      DX-CHEST-PORTABLE (1 VIEW)   Final Result         1. Intubated. No  pulmonary infiltrates or consolidations are noted.      DX-ABDOMEN FOR TUBE PLACEMENT   Final Result         Feeding tube with tip projecting over the expected area of the stomach fundus.      CT-CTA HEAD WITH & W/O-POST PROCESS   Final Result         1. There is active hemorrhage within the large parenchymal hematoma in the left basal ganglia.         CRITICAL RESULT READ BACK: Preliminary findings discussed with and critical read back performed by Dr. BRYAN RODRIGUEZ in the Emergency Department via telephone on 4/26/2019 9:22 PM      CT-HEAD W/O   Final Result         1. Large parenchymal hemorrhage in the left basal ganglia extending to the left lateral ventricle and right lateral ventricle.      2. Dilated lateral ventricle, concerning for obstructive hydrocephalus.      3. Left to right midline shift of 7 mm.         CRITICAL RESULT READ BACK: Preliminary findings discussed with and critical read back performed by Dr. BRYAN RODRIGUEZ in the Emergency Department via telephone on 4/26/2019 9:15 PM      CT-CTA NECK WITH & W/O-POST PROCESSING   Final Result      1. No evidence of flow-limiting stenosis in the cervical carotid or cervical vertebral arteries.      CT-CEREBRAL PERFUSION ANALYSIS   Final Result      1.  Cerebral blood flow less than 30% = 82 mL.      2.  T Max more than 6 seconds = 201 mL.      3.  Mismatched volume = 119      4.  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  There was already hydrocephalus yesterday CT head, but NSG decided against any procedures.  Per neurology there is unfortunately nothing I can offer, as he is already with brainstem involvement, and beyond any treatment for medical cerebral edema.  Palliative care only.  Continue with B/P 140-160 systolic for now.    Alzheimer's : was living in NH , baseline unknown       Yris Mcwilliams M.D.    Clinical  Professor, [employer]  Diplomate, Neurology , Vascular Neurology, Neurophysiology

## 2019-04-27 NOTE — PROCEDURES
Central Line Insertion  Date/Time: 4/27/2019 1:12 AM  Performed by: SIDDHARTHA CHAVEZ JR  Authorized by: SIDDHARTHA CHAVEZ JR     Consent:     Consent obtained:  Emergent situation  Universal protocol:     Patient identity confirmed:  Arm band  Pre-procedure details:     Hand hygiene: Hand hygiene performed prior to insertion      Sterile barrier technique: All elements of maximal sterile technique followed      Skin preparation:  2% chlorhexidine    Skin preparation agent: Skin preparation agent completely dried prior to procedure    Sedation:     Sedation type: vent.  Anesthesia:     Anesthesia method:  Local infiltration    Local anesthetic:  Lidocaine 1% w/o epi  Procedure details:     Location:  R subclavian    Patient position:  Flat    Procedural supplies:  Triple lumen    Catheter size:  7 Fr    Landmarks identified: yes      Ultrasound guidance: no      Number of attempts:  1    Successful placement: yes    Post-procedure details:     Post-procedure:  Dressing applied and line sutured    Assessment:  Blood return through all ports, placement verified by x-ray, no pneumothorax on x-ray and free fluid flow    Patient tolerance of procedure:  Tolerated well, no immediate complications

## 2019-04-27 NOTE — CONSULTS
Critical Care Consultation    Date of consult: 4/26/2019    Referring Physician  Constantin Lehman M.D.    Reason for Consultation  [facility], [facility]    History of Presenting Illness  78 y.o. 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  Per reports the patient was found at his home on the ground with his head against the wall.  He was last seen normal around 6 PM.  He was noted to have right hemiplegia upon EMS arrival, and had copious emesis with obvious aspirate.  Patient was intubated following CT in the ER.  Neuroimaging well in the ER showed a 4.4 x 5.2 x 5.7 cm left basal ganglia hemorrhage extending into the lateral ventricles, third and fourth ventricles with associated 7 mm left-to-right shift.  CTA suggested active hemorrhage. Neurology was consulted in the ER and recommended strict blood pressure control, correction of coagulopathy, hypertonic saline and neurosurgical evaluation.  Neurosurgery has seen the patient and determined that there is no role for neurosurgical interventions in the setting and recommended palliative/comfort care.    Code Status  Full Code    Review of Systems  Review of Systems   Unable to perform ROS: Acuity of condition       Past Medical History   has a past medical history of CAD (coronary artery disease) (1990); Cancer ([facility]); Chronic autoimmune thyroiditis; Dementia in Alzheimer's disease; Dental disorder; High cholesterol; Hypertension; Hypothyroidism; MEDICAL HOME; Melanoma in situ of ear (HCC); Myocardial infarct (HCC); Pain; Pericarditis; Personal history of venous thrombosis and embolism (5/2012); S/P orchiectomy; Stroke ([facility]) (5/2012); and Urinary incontinence.    Surgical History   has a past surgical history that includes other surgical procedure (1990's); mass excision general  (8/21/2013); hip hemiarthroplasty (7/1/2014); cataract phaco with iol (4/7/2015); cataract phaco with iol (4/21/2015); other (Right); and hip arthroplasty total (Left, 5/24/2018).    Family History  family history includes Dementia in his father; Hypertension in his unknown relative; Other (age of onset: 90) in his mother.    Social History   reports that he quit smoking about 10 years ago. He has a 45.00 pack-year smoking history. He has never used smokeless tobacco. He reports that he drinks about 0.5 oz of alcohol per week . He reports that he does not use drugs.    Medications  Home Medications     Reviewed by Uvaldo Joshi R.N. (Registered Nurse) on 04/26/19 at 2120  Med List Status: Unable to Obtain   Medication Last Dose Status   celecoxib (CELEBREX) 200 MG Cap  Active   Cholecalciferol (VITAMIN D) 2000 UNIT Tab  Active   clopidogrel (PLAVIX) 75 MG Tab  Active   Diclofenac Sodium (VOLTAREN) 1 % Gel  Active   donepezil (ARICEPT) 10 MG tablet  Active   lisinopril (PRINIVIL) 20 MG Tab  Active   melatonin 3 MG Tab  Active   melatonin 3 MG Tab  Active   Memantine HCl ER (NAMENDA XR) 28 MG CAPSULE SR 24 HR  Active   mirtazapine (REMERON) 15 MG Tab  Active   simvastatin (ZOCOR) 20 MG Tab  Active   tamsulosin (FLOMAX) 0.4 MG capsule  Active              Current Facility-Administered Medications   Medication Dose Route Frequency Provider Last Rate Last Dose   • MD Alert...PROPOFOL CRITICAL CARE PROTOCOL   Other PRN Constantin Lehman M.D.       • propofol (DIPRIVAN) injection  0-80 mcg/kg/min Intravenous Continuous Constantin Lehman M.D.       • labetalol (NORMODYNE,TRANDATE) 200 mg in bottle/bag empty 40 mL Infusion  0-8 mg/min Intravenous Continuous Constantin Lehman M.D.         Current Outpatient Prescriptions   Medication Sig Dispense Refill   • melatonin 3 MG Tab TAKE 2 TABLETS (6MG) BY MOUTH AT BEDTIME. 60 Tab 0   • Cholecalciferol (VITAMIN D) 2000 UNIT Tab TAKE 1 TABLET BY MOUTH ONCE DAILY. 90 Tab 3   •  celecoxib (CELEBREX) 200 MG Cap Take 1 Cap by mouth every day. 90 Cap 3   • lisinopril (PRINIVIL) 20 MG Tab Take 1 Tab by mouth every day. 90 Tab 3   • simvastatin (ZOCOR) 20 MG Tab Take 1 Tab by mouth every evening. 90 Tab 3   • mirtazapine (REMERON) 15 MG Tab Take 1 Tab by mouth every bedtime. 90 Tab 3   • clopidogrel (PLAVIX) 75 MG Tab Take 1 Tab by mouth every day. 90 Tab 3   • tamsulosin (FLOMAX) 0.4 MG capsule Take 1 Cap by mouth ONE-HALF HOUR AFTER BREAKFAST. 90 Cap 3   • Memantine HCl ER (NAMENDA XR) 28 MG CAPSULE SR 24 HR Take 1 Cap by mouth every day. 30 Cap 11   • donepezil (ARICEPT) 10 MG tablet TAKE 1 TABLET BY MOUTH ONCE DAILY. 30 Tab 11   • melatonin 3 MG Tab Take 3 mg by mouth every bedtime.     • Diclofenac Sodium (VOLTAREN) 1 % Gel Apply 1 Inch to skin as directed 4 times a day. 5 Tube 6       Allergies  Allergies   Allergen Reactions   • Zolpidem Unspecified     Daytime confusion         Vital Signs last 24 hours  Temp:  [37 °C (98.6 °F)] 37 °C (98.6 °F)  Pulse:  [] 105  Resp:  [20] 20  BP: (163)/(85) 163/85  SpO2:  [91 %-100 %] 98 %    Physical Exam  Physical Exam   Constitutional: He appears well-developed and well-nourished. He is intubated.   HENT:   Head: Normocephalic.   Right Ear: External ear normal.   Left Ear: External ear normal.   Nose: Nose normal.   ETT in position, right-sided head abrasion   Neck: Neck supple. No JVD present. No tracheal deviation present.   Cardiovascular: Regular rhythm and intact distal pulses.  Tachycardia present.    Pulmonary/Chest: Breath sounds normal. No accessory muscle usage. He is intubated. No respiratory distress.   Abdominal: Soft. Bowel sounds are normal. He exhibits no distension. There is no tenderness.   Musculoskeletal: Normal range of motion. He exhibits no tenderness or deformity.   Neurological:   Pupils are 2 mm bilaterally minimally reactive.  No obvious facial droop.  + cough, weak gag, + corneal.  Patient attempts to localize with  left upper extremity and withdraws left lower extremity.  He has extensor posturing of his right upper and right lower externally.  Babinski's are upgoing bilaterally.   Skin: Skin is warm and dry. No rash noted.   Psychiatric:   Unable to assess   Nursing note and vitals reviewed.      Fluids  No intake or output data in the 24 hours ending 04/26/19 2208    Laboratory  Recent Results (from the past 48 hour(s))   CBC WITH DIFFERENTIAL    Collection Time: 04/26/19  9:01 PM   Result Value Ref Range    WBC 22.6 (H) 4.8 - 10.8 K/uL    RBC 4.32 (L) 4.70 - 6.10 M/uL    Hemoglobin 13.8 (L) 14.0 - 18.0 g/dL    Hematocrit 41.5 (L) 42.0 - 52.0 %    MCV 96.1 81.4 - 97.8 fL    MCH 31.9 27.0 - 33.0 pg    MCHC 33.3 (L) 33.7 - 35.3 g/dL    RDW 48.9 35.9 - 50.0 fL    Platelet Count 275 164 - 446 K/uL    MPV 10.4 9.0 - 12.9 fL    Neutrophils-Polys 92.90 (H) 44.00 - 72.00 %    Lymphocytes 2.40 (L) 22.00 - 41.00 %    Monocytes 4.00 0.00 - 13.40 %    Eosinophils 0.00 0.00 - 6.90 %    Basophils 0.20 0.00 - 1.80 %    Immature Granulocytes 0.50 0.00 - 0.90 %    Nucleated RBC 0.00 /100 WBC    Neutrophils (Absolute) 21.00 (H) 1.82 - 7.42 K/uL    Lymphs (Absolute) 0.55 (L) 1.00 - 4.80 K/uL    Monos (Absolute) 0.91 (H) 0.00 - 0.85 K/uL    Eos (Absolute) 0.00 0.00 - 0.51 K/uL    Baso (Absolute) 0.04 0.00 - 0.12 K/uL    Immature Granulocytes (abs) 0.11 0.00 - 0.11 K/uL    NRBC (Absolute) 0.00 K/uL   COMP METABOLIC PANEL    Collection Time: 04/26/19  9:01 PM   Result Value Ref Range    Sodium 143 135 - 145 mmol/L    Potassium 3.8 3.6 - 5.5 mmol/L    Chloride 109 96 - 112 mmol/L    Co2 22 20 - 33 mmol/L    Anion Gap 12.0 (H) 0.0 - 11.9    Glucose 122 (H) 65 - 99 mg/dL    Bun 20 8 - 22 mg/dL    Creatinine 0.63 0.50 - 1.40 mg/dL    Calcium 9.9 8.5 - 10.5 mg/dL    AST(SGOT) 28 12 - 45 U/L    ALT(SGPT) 27 2 - 50 U/L    Alkaline Phosphatase 91 30 - 99 U/L    Total Bilirubin 0.7 0.1 - 1.5 mg/dL    Albumin 4.1 3.2 - 4.9 g/dL    Total Protein 6.3 6.0 -  8.2 g/dL    Globulin 2.2 1.9 - 3.5 g/dL    A-G Ratio 1.9 g/dL   PROTHROMBIN TIME    Collection Time: 19  9:01 PM   Result Value Ref Range    PT 14.1 12.0 - 14.6 sec    INR 1.07 0.87 - 1.13   APTT    Collection Time: 19  9:01 PM   Result Value Ref Range    APTT 30.3 24.7 - 36.0 sec   TROPONIN    Collection Time: 19  9:01 PM   Result Value Ref Range    Troponin I 0.02 0.00 - 0.04 ng/mL   ESTIMATED GFR    Collection Time: 19  9:01 PM   Result Value Ref Range    GFR If African American >60 >60 mL/min/1.73 m 2    GFR If Non African American >60 >60 mL/min/1.73 m 2   EKG (NOW)    Collection Time: 19 10:05 PM   Result Value Ref Range    Report       [facility] Emergency Dept.    Test Date:  2019  Pt Name:    SIDDHARTHA DIALLO             Department: ER  MRN:        [id number]                      Room:       [facility]  Gender:     Male                         Technician: [id number]  :        [id number]                   Requested By:BRYAN RODRIGUEZ  Order #:    [id number]                    Reading MD:    Measurements  Intervals                                Axis  Rate:       99                           P:          82  NY:         184                          QRS:        73  QRSD:       94                           T:          27  QT:         364  QTc:        468    Interpretive Statements  SINUS RHYTHM  PROBABLE LEFT ATRIAL ABNORMALITY  BORDERLINE INFERIOR Q WAVES  Compared to ECG 05/10/2018 09:50:23  No significant changes         Imaging  DX-CHEST-FOR LINE PLACEMENT Perform procedure in: Patient's Room   Final Result            1. A right peripherally inserted catheter with tip projects appropriately over the expected area of the superior vena cava.      DX-CHEST-PORTABLE (1 VIEW)   Final Result         1. Intubated. No pulmonary infiltrates or consolidations are noted.      DX-ABDOMEN FOR TUBE PLACEMENT   Final Result         Feeding tube with tip projecting over the  expected area of the stomach fundus.      CT-CTA HEAD WITH & W/O-POST PROCESS   Final Result         1. There is active hemorrhage within the large parenchymal hematoma in the left basal ganglia.         CRITICAL RESULT READ BACK: Preliminary findings discussed with and critical read back performed by Dr. BRYAN RODRIGUEZ in the Emergency Department via telephone on 4/26/2019 9:22 PM      CT-HEAD W/O   Final Result         1. Large parenchymal hemorrhage in the left basal ganglia extending to the left lateral ventricle and right lateral ventricle.      2. Dilated lateral ventricle, concerning for obstructive hydrocephalus.      3. Left to right midline shift of 7 mm.         CRITICAL RESULT READ BACK: Preliminary findings discussed with and critical read back performed by Dr. BRYAN RODRIGUEZ in the Emergency Department via telephone on 4/26/2019 9:15 PM      CT-CTA NECK WITH & W/O-POST PROCESSING   Final Result      1. No evidence of flow-limiting stenosis in the cervical carotid or cervical vertebral arteries.      CT-CEREBRAL PERFUSION ANALYSIS   Final Result      1.  Cerebral blood flow less than 30% = 82 mL.      2.  T Max more than 6 seconds = 201 mL.      3.  Mismatched volume = 119      4.  These abnormal areas correspond to the hemorrhage seen on CT      DX-CHEST-PORTABLE (1 VIEW)    (Results Pending)       Assessment/Plan  * Acute spontaneous intraparenchymal intracranial hemorrhage associated with hypertension (HCC)- (present on admission)   Assessment & Plan    Left basal ganglia with intraventricular extension  ICH score 3: 72% mortality  Patient is on Plavix, will check TEG and correct coagulopathy if needed.  Maintain SBP <140 mmHg. Hydralazine, Labetalol and Nicardipine available  Start 3% and titrate to maintain high normal sodium levels.  Neurosurgery not recommending intervention.  Patient has a state-appointed guardian, I have spoken with oYana at the [facility] public guardians office, she is  attempting to located a code status for this patient.  She states they are estate only guardians and the patient makes his own medical decisions.  The patient possibly has family in the [place].    Palliative Care consult placed, social work consult also placed for location of next of kin.     Scalp abrasion- (present on admission)   Assessment & Plan    Wound care     Aspiration into airway   Assessment & Plan    Will start Unasyn  As needed bronchoscopy for sputum  Follow-up respiratory cultures     Acute hypoxemic respiratory failure (HCC)   Assessment & Plan    Intubated in ER on 4/26  RT/O2 protocols  Daily and PRN ABGs  Titration of ventilator therapy based on ABGs and patient's status  Sedation as tolerated/indicated  Daily CXR  HOB >30 degrees and peridex for VAP prevention  Pepcid for GI prophylaxis  SAT/SBT when able (ABCDEF Bundle)  Early mobility    Unlikely to be a candidate for SAT or SBT given large intraparenchymal hemorrhage     Chronic obstructive pulmonary disease (HCC)- (present on admission)   Assessment & Plan    History of  Not in current acute exacerbation  RT/O2 Protocols  Titrate supplemental FiO2 to maintain SpO2 >88%     Dementia associated with other underlying disease without behavioral disturbance- (present on admission)   Assessment & Plan    On Remeron, Namenda and Aricept at nursing home  Reportedly makes his own medical decisions per public guardians office     Pulmonary hypertension (HCC)- (present on admission)   Assessment & Plan    RVSP 30  Judicious IV fluid use     Essential hypertension- (present on admission)   Assessment & Plan    Requiring nicardipine infusion and titration  This is likely the etiology of his hemorrhage     CAD (coronary artery disease)- (present on admission)   Assessment & Plan    On Plavix at home  TEG pending         Discussed patient condition and risk of morbidity and/or mortality with Hospitalist, RN, RT, Pharmacy, Charge nurse / hot rounds, neurology  and neurosurgery and Public guardians office.      The patient remains critically ill.  Critical care time = 80 minutes in directly providing and coordinating critical care and extensive data review.  No time overlap and excludes procedures.

## 2019-04-27 NOTE — CONSULTS
Neurosurgery Consult Note    Patient: Kailash Haile    MRN: [id number]    Date of Consultation: 4/27/2019    Reason for Consultation: Large left parietal hemorrhage with intraventricular extension    Referring Physician: Dr. He Lehman    History of Present Illness:  Kailash Haile is a 78 y.o. male with a history of dementia who is living at a nursing home who presents for evaluation of altered level of consciousness and possible stroke.  Patient was found by staff on the ground with his head wedged up against the wall.  Patient is noted to have large abrasion to the top right side of his head.  Patient was vomiting and may have aspirated and had a diminished level since on scene and was transported immediately for possible stroke.  Patient was last seen normal approximately 20:00 yesterday.    Review of Systems:  Unable to obtain as the patient is unconscious.    Medications:  Current Facility-Administered Medications   Medication Dose Route Frequency Provider Last Rate Last Dose   • Respiratory Care per Protocol   Nebulization Continuous RT MARYLIN Alford Jr..O.       • famotidine (PEPCID) tablet 20 mg  20 mg Oral Q12HRS MARYLIN Alford Jr..O.        Or   • famotidine (PEPCID) injection 20 mg  20 mg Intravenous Q12HRS MARYLIN Alford Jr..O.       • senna-docusate (PERICOLACE or SENOKOT S) 8.6-50 MG per tablet 2 Tab  2 Tab Oral BID MARYLIN Alford Jr..O.        And   • polyethylene glycol/lytes (MIRALAX) PACKET 1 Packet  1 Packet Oral QDAY PRN MARYLIN Alford Jr..CARA.        And   • magnesium hydroxide (MILK OF MAGNESIA) suspension 30 mL  30 mL Oral QDAY PRN MARYLIN Alford Jr..OCody        And   • bisacodyl (DULCOLAX) suppository 10 mg  10 mg Rectal QDAY PRN MARYLIN Alford Jr..O.       • MD Alert...ICU Electrolyte Replacement per Pharmacy   Other PHARMACY TO DOSE MARYLIN Alford Jr..CARA.       • Pharmacy Consult: Enteral tube insertion - review meds/change route/product  selection   Other PHARMACY TO DOSE Kailash Allison Jr., D.O.       • MD Alert...PROPOFOL CRITICAL CARE PROTOCOL 1 Each  1 Each Other PRN Kailash Allison Jr., D.O.       • fentaNYL (SUBLIMAZE) injection 25 mcg  25 mcg Intravenous Q HOUR PRN Kailash Allison Jr., D.O.        Or   • fentaNYL (SUBLIMAZE) injection 50 mcg  50 mcg Intravenous Q HOUR PRN Kailash Allison Jr., D.O.        Or   • fentaNYL (SUBLIMAZE) injection 100 mcg  100 mcg Intravenous Q HOUR PRN MARYLIN Alford Jr..O.       • insulin regular (HUMULIN R) injection 2-9 Units  2-9 Units Subcutaneous Q6HRS MARYLIN Alford Jr..ALISSA        And   • glucose 4 g chewable tablet 16 g  16 g Oral Q15 MIN PRN Kailash Allison Jr., D.O.        And   • dextrose 50% (D50W) injection 25 mL  25 mL Intravenous Q15 MIN PRN Kailash Allison Jr., D.O.       • propofol (DIPRIVAN) injection  0-80 mcg/kg/min Intravenous Continuous Kailash Allison Jr., D.O.       • NS infusion   Intravenous Continuous Kat Hooks M.D. 80 mL/hr at 04/27/19 0018     • LORazepam (ATIVAN) injection 2 mg  2 mg Intravenous Q5 MIN PRN Kat Hooks M.D.       • acetaminophen (TYLENOL) tablet 650 mg  650 mg Oral Q4HRS PRN Kat Hooks M.D.        Or   • acetaminophen (TYLENOL) suppository 650 mg  650 mg Rectal Q4HRS PRN Kat Hooks M.D.       • ondansetron (ZOFRAN ODT) dispertab 4 mg  4 mg Oral Q4HRS PRN Kat Hooks M.D.        Or   • ondansetron (ZOFRAN) syringe/vial injection 4 mg  4 mg Intravenous Q4HRS PRN Kat Hooks M.D.       • levETIRAcetam (KEPPRA) 500 mg in  mL IVPB  500 mg Intravenous Q12HRS Kat Hooks M.D. 400 mL/hr at 04/27/19 0042 500 mg at 04/27/19 0042   • niCARdipine (CARDENE) 25 mg in  mL Infusion  2.5-15 mg/hr Intravenous Continuous Kailash Allison Jr., D.O. 100 mL/hr at 04/27/19 0003 10 mg/hr at 04/27/19 0003   • norepinephrine (LEVOPHED) 8 mg in  mL Infusion  1-30 mcg/min Intravenous Continuous Kailash Allison Jr., D.O.    "Stopped at 04/27/19 0015   • 3% sodium chloride (HYPERTONIC SALINE) 500mL infusion 500 mL  500 mL Intravenous Continuous Kailash Allison Jr., D.O.       • labetalol (NORMODYNE,TRANDATE) injection 10 mg  10 mg Intravenous Q HOUR PRN Kailash Allison Jr., D.O.       • hydrALAZINE (APRESOLINE) injection 20 mg  20 mg Intravenous Q4HRS PRN Kailash Allison Jr., D.O.       • ampicillin/sulbactam (UNASYN) 3 g in  mL IVPB  3 g Intravenous Q6HRS Kailash Allison Jr., D.O.           Allergies:  Allergies   Allergen Reactions   • Zolpidem Unspecified     Daytime confusion         Past Medical History:  Past Medical History:   Diagnosis Date   • CAD (coronary artery disease) 1990     S/P acute MI   • Cancer (HCC)     skin   • Chronic autoimmune thyroiditis    • Dementia in Alzheimer's disease    • Dental disorder     \"hygiene issues\"   • High cholesterol    • Hypertension    • Hypothyroidism    • MEDICAL HOME    • Melanoma in situ of ear (HCC)    • Myocardial infarct (HCC)     1990   • Pain     left hip   • Pericarditis    • Personal history of venous thrombosis and embolism 5/2012    stroke   • S/P orchiectomy      R / undescended testicle   • Stroke (HCC) 5/2012    weakness marquez side (Hx of stroke x2)   • Urinary incontinence     occasionally       Past Surgical History:  Past Surgical History:   Procedure Laterality Date   • HIP ARTHROPLASTY TOTAL Left 5/24/2018    Procedure: HIP ARTHROPLASTY TOTAL- CONVERSION POSTERIOR ;  Surgeon: Luca Brewster M.D.;  Location: [facility];  Service: Orthopedics   • CATARACT PHACO WITH IOL  4/21/2015    Performed by Matthew Robles M.D. at [facility]   • CATARACT PHACO WITH IOL  4/7/2015    Performed by Matthew Robles M.D. at [facility]   • HIP HEMIARTHROPLASTY  7/1/2014    Performed by Devon Horowitz M.D. at [facility]   • MASS EXCISION GENERAL  8/21/2013    Performed by Avila Maya M.D. at [facility] ORS "   • OTHER Right     right testicle removed as a baby   • OTHER SURGICAL PROCEDURE  1990's    ear lobe surgery       Family History:  Family History   Problem Relation Age of Onset   • Other Mother 90        unknown   • Dementia Father    • Hypertension Unknown    • Genetic Neg Hx        Social History:  Social History     Social History   • Marital status:      Spouse name: N/A   • Number of children: N/A   • Years of education: N/A     Occupational History   • Not on file.     Social History Main Topics   • Smoking status: Former Smoker     Packs/day: 1.50     Years: 30.00     Quit date: 1/1/2009   • Smokeless tobacco: Never Used   • Alcohol use 0.5 oz/week     1 Standard drinks or equivalent per week      Comment: once a month   • Drug use: No   • Sexual activity: Not Currently     Other Topics Concern   • Not on file     Social History Narrative   • No narrative on file       Physical Examination:  No spontaneous eye opening  Pupils 1-2 mm bilaterally  Extensor posturing on the right, brisk withdrawal on the left    Labs:  Recent Labs      04/26/19   2101   WBC  22.6*   RBC  4.32*   HEMOGLOBIN  13.8*   HEMATOCRIT  41.5*   MCV  96.1   MCH  31.9   MCHC  33.3*   RDW  48.9   PLATELETCT  275   MPV  10.4     Recent Labs      04/26/19   2101 04/27/19   0010   SODIUM  143  142   POTASSIUM  3.8  3.6   CHLORIDE  109  111   CO2  22  20   GLUCOSE  122*  200*   BUN  20  18   CREATININE  0.63  0.64   CALCIUM  9.9  9.3     Recent Labs      04/26/19   2101   APTT  30.3   INR  1.07           Imaging:  CT of the head shows a large hemorrhage based in the left basal ganglia measuring approximately 4.5 x 3.8 cm in diameter.  On CTA there appears to be active hemorrhage.  There is some intraventricular extension    Assessment and Plan:  Kailash aHile is a 78-year-old gentleman with a history of dementia who is normally at a care facility.  He now presents with massive dominant hemisphere intracerebral hemorrhage.   There is no role for neurosurgical intervention given that he has dementia and poor function at baseline, I would recommend comfort care, and we have a call out to his legal guardian.    Kailash Bishop M.D.

## 2019-04-27 NOTE — ASSESSMENT & PLAN NOTE
Intubated in ER on 4/26  RT/O2 protocols  Ventilator bundle  Serial ABG/chest x-ray/ventilator mechanics review  Sedation as tolerated/indicated, currently titrating propofol  SBT when clinically appropriate  Unlikely to be a candidate for SAT or SBT given large intraparenchymal hemorrhage

## 2019-04-27 NOTE — DISCHARGE PLANNING
"Medical Social Work    Spoke with IMTIAZ Mccormack who states she spoke with Jerri Romano (public guardian [phone]) and was advised they only have guardian of estate, not person. Court order from 2013 indicates public guardian has guardianship of person and estate. LCSW contacted Jerri ([phone]) for clarification. Jerri states that guardianship changed in 2014 and public guardian only has guardianship of estate. She is not aware or able to locate an advanced directive or POLST for patient. She provided LCSW with the following collateral contacts for patient:    Cousins   Vera Benz, [email or web address]  \"[email or web address]\" Blum, [address] [place] [address],  [id number]    Unknown relation to patient   Karyna [email or web address] [email or web address]    Friends   Breanne Karen: [phone]  CLAUDIA: [phone]   Karen: [phone]  El Patel: [phone] or [phone]    Attempted phone contact with pt's primary emergency contact Zohreh Schaffer ([phone]), left  requesting call back. Per prior chart documentation Zohreh is an RN Geriatric Care Manager. *Received VM back from Zohreh indicating she is no longer involved with patient and recommended we contact public guardian office.    Per Informion search 1st degree relative listed is Jerri Trammell, [phone]. Attempted phone contact, not a working phone number.     Phone contact with pt's friend El Patel ([phone]). El states he hasn't spoken with pt in some time but reports they are friends. He was not aware pt was hospitalization. He does not have any contact information for pt's family/NOK. El states pt is a resident at [facility].    Contacted [facility] ([phone]). Spoke with Madison who states the only emergency contact information they have for pt is for the [facility] Public Guardian office. Madison checked pt's room for a POLST and states there is no POLST visible in pt's room.     Updated BSN and " Dr. Avila.

## 2019-04-27 NOTE — ASSESSMENT & PLAN NOTE
History of  Not in current acute exacerbation  RT/O2 Protocols  DuoNeb 4 times daily and as needed  No role for steroids at this time  Titrate supplemental FiO2 to maintain SpO2 >88%

## 2019-04-27 NOTE — PROGRESS NOTES
Neurosurgery Progress Note    Subjective:  No acute events    Exam:  No eye opening  Pupils 1-2 mm downcast nonreactive  Motor:  Flexes left extends right  Extends bilateral lower extremities    BP  Min: 163/85  Max: 163/85  Pulse  Av.1  Min: 66  Max: 123  Resp  Av.5  Min: 17  Max: 51  Temp  Av °C (98.6 °F)  Min: 36.8 °C (98.2 °F)  Max: 37.5 °C (99.5 °F)  Monitored Temp 2  Av.8 °C (100.1 °F)  Min: 37.4 °C (99.3 °F)  Max: 38.3 °C (100.9 °F)  SpO2  Av %  Min: 90 %  Max: 100 %    No Data Recorded    Recent Labs      19   WBC  22.6*  26.7*   RBC  4.32*  4.02*   HEMOGLOBIN  13.8*  12.7*   HEMATOCRIT  41.5*  38.3*   MCV  96.1  95.3   MCH  31.9  31.6   MCHC  33.3*  33.2*   RDW  48.9  48.6   PLATELETCT  275  263   MPV  10.4  10.2     Recent Labs      19   0010  19   043   SODIUM  143  142  139   POTASSIUM  3.8  3.6  3.7   CHLORIDE  109  111  112   CO2  22  20  22   GLUCOSE  122*  200*  177*   BUN  20  18  20   CREATININE  0.63  0.64  0.63   CALCIUM  9.9  9.3  9.0     Recent Labs      19   APTT  30.3   INR  1.07     Recent Labs      19   0607   REACTMIN  3.9*   CLOTKINET  1.8   CLOTANGL  66.5   MAXCLOTS  67.3   [id number]  0.0   PRCINADP  40.4   PRCINAA  10.5       Intake/Output       19 - 19 - 1959       Total  Total       Intake    P.O.  --  -- --  30  -- 30    P.O. -- -- -- 30 -- 30    I.V.  --  874.7 874.7  502.8  -- 502.8    Magnesium Sulfate Volume -- -- -- 36.7 -- 36.7    Cardene Volume -- 221.7 221.7 0 -- 0    Propofol Volume -- 60.6 60.6 26.2 -- 26.2    Norepinephrine Volume -- -- -- 0 -- 0    Volume (mL) (NS infusion) -- 456 456 320 -- 320    Volume (mL) (3% sodium chloride (HYPERTONIC SALINE) 500mL infusion 500 mL) -- 136.5 136.5 120 -- 120    Other  --  -- --  60  -- 60    Medications (PO/Enteral Liquids) -- -- -- 60 -- 60    IV  Piggyback  --  300 300  100  -- 100    Volume (mL) (levETIRAcetam (KEPPRA) 500 mg in  mL IVPB) -- 100 100 100 -- 100    Volume (mL) (ampicillin/sulbactam (UNASYN) 3 g in  mL IVPB) -- 200 200 0 -- 0    Total Intake -- 1174.7 1174.7 692.8 -- 692.8       Output    Urine  --  525 525  170  -- 170    Output (mL) (Urethral Catheter 16 Fr.) -- 525 525 170 -- 170    Stool  --  0 0  --  -- --    Number of Times Stooled -- 0 x 0 x -- -- --    Measurable Stool (mL) -- 0 0 -- -- --    Emesis/NG output  --  0 0  50  -- 50    Output (mL) (Enteral Tube 04/26/19 Orogastric Oral) -- 0 0 50 -- 50    Total Output -- 525 525 220 -- 220       Net I/O     -- 649.7 649.7 472.8 -- 472.8            Intake/Output Summary (Last 24 hours) at 04/27/19 1051  Last data filed at 04/27/19 1000   Gross per 24 hour   Intake          1867.58 ml   Output              745 ml   Net          1122.58 ml            • Respiratory Care per Protocol   Continuous RT   • famotidine  20 mg Q12HRS    Or   • famotidine  20 mg Q12HRS   • senna-docusate  2 Tab BID    And   • polyethylene glycol/lytes  1 Packet QDAY PRN    And   • magnesium hydroxide  30 mL QDAY PRN    And   • bisacodyl  10 mg QDAY PRN   • MD Alert...Adult ICU Electrolyte Replacement per Pharmacy   PHARMACY TO DOSE   • Pharmacy   PHARMACY TO DOSE   • MD Alert...PROPOFOL CRITICAL CARE PROTOCOL  1 Each PRN   • fentaNYL  25 mcg Q HOUR PRN    Or   • fentaNYL  50 mcg Q HOUR PRN    Or   • fentaNYL  100 mcg Q HOUR PRN   • insulin regular  2-9 Units Q6HRS    And   • glucose  16 g Q15 MIN PRN    And   • dextrose 50%  25 mL Q15 MIN PRN   • propofol  0-80 mcg/kg/min Continuous   • NS   Continuous   • LORazepam  2 mg Q5 MIN PRN   • acetaminophen  650 mg Q4HRS PRN    Or   • acetaminophen  650 mg Q4HRS PRN   • ondansetron  4 mg Q4HRS PRN    Or   • ondansetron  4 mg Q4HRS PRN   • levETIRAcetam (KEPPRA) IV  500 mg Q12HRS   • niCARdipine infusion  2.5-15 mg/hr Continuous   • NORepinephrine (LEVOPHED)  infusion  1-30 mcg/min Continuous   • 3% sodium chloride  500 mL Continuous   • labetalol  10 mg Q HOUR PRN   • hydrALAZINE  20 mg Q4HRS PRN   • ampicillin-sulbactam (UNASYN) IV  3 g Q6HRS       Assessment and Plan:  Hospital day #1 large ICH  POD #NA  Prophylactic anticoagulation: no         Start date/time: tbd  Poor neurologic examination in setting of preexisting dementia and large ICH.  Do not feel patient would return to functioning/independence with surgical intervention.  Nursing staff with ongoing attempts to contact family.

## 2019-04-27 NOTE — CARE PLAN
Problem: Safety - Medical Restraint  Goal: Remains free of injury from restraints (Restraint for Interference with Medical Device)  INTERVENTIONS:  1. Determine that other, less restrictive measures have been tried or would not be effective before applying the restraint  2. Evaluate the patient's condition at the time of restraint application  3. Inform patient/family regarding the reason for restraint  4. Q2H: Monitor safety, psychosocial status, comfort, nutrition and hydration   Outcome: PROGRESSING AS EXPECTED   04/27/19 0413   Interventions Addressed    Addressed this shift: Remains free of injury from restraints (restraint for interference with medical device) Determine that other, less restrictive measures have been tried or would not be effective before applying the restraint;Evaluate the patient's condition at the time of restraint application;Inform patient/family regarding the reason for restraint;Every 2 hours: Monitor safety, psychosocial status, comfort, nutrition and hydration       Problem: Safety  Goal: Will remain free from falls  Outcome: PROGRESSING AS EXPECTED  Pt restrained. Bed alarm on. Bed in low locked position. Call light within reach

## 2019-04-27 NOTE — DISCHARGE PLANNING
Medical Social Work     MAINOR received a call from Dr. Allison requesting MAINOR to try to contact the pt guardian so they can be update on the pt. MAINOR was able to make contact with Jerri Romano (public guardian)  [phone]. MAINOR was able to make contact with her and advised Jerri that the MD would like to speak to her about the pt. Jerri advised MAINOR that she would be waiting for the MD to call her. MAINOR called Dr. Allison and provided him with the pt phone number for Jerri.     Plan: MAINOR will remain available for pt support.

## 2019-04-27 NOTE — PROGRESS NOTES
Unable to obtain Med Rec at this time.    Attempted to contact [address] in contacts, no answer.  Pt does not know about family or what assisted living facility he came from, per R.N.s.    Pt unable to participate in interview at this time.

## 2019-04-27 NOTE — ED PROVIDER NOTES
ED Provider Note    CHIEF COMPLAINT  Chief Complaint   Patient presents with   • ALOC     Last known well @ 2000       HPI  Kailash Haile is a 78 y.o. male who presents for evaluation of altered level of consciousness and possible stroke.  Patient was found by staff on the ground with his head wedged up against the wall.  Patient is noted to have large abrasion to the top right side of his head.  Patient was vomiting and had a diminished level since on scene and was transported immediately for possible stroke.  Patient was last seen normal approximately around 6 PM and was found around 8 PM.    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    SOCIAL HISTORY  Social History     Social History Main Topics   • Smoking status: Former Smoker     Packs/day: 1.50     Years: 30.00     Quit date: 1/1/2009   • Smokeless tobacco: Never Used   • Alcohol use 0.5 oz/week     1 Standard drinks or equivalent per week      Comment: once a month   • Drug use: No   • Sexual activity: Not Currently       SURGICAL HISTORY   has a past surgical history that includes other surgical procedure (1990's); mass excision general (8/21/2013); hip hemiarthroplasty (7/1/2014); cataract phaco with iol (4/7/2015); cataract phaco with iol (4/21/2015); other (Right); and hip arthroplasty total (Left, 5/24/2018).    CURRENT MEDICATIONS  Home Medications     Reviewed by Hayes De La Rosa (Pharmacy Tech) on 04/26/19 at 2336  Med List Status: Unable to Obtain   Medication  Last Dose Status   celecoxib (CELEBREX) 200 MG Cap  Active   Cholecalciferol (VITAMIN D) 2000 UNIT Tab  Active   clopidogrel (PLAVIX) 75 MG Tab  Active   Diclofenac Sodium (VOLTAREN) 1 % Gel  Active   donepezil (ARICEPT) 10 MG tablet  Active   lisinopril (PRINIVIL) 20 MG Tab  Active   melatonin 3 MG Tab  Active   melatonin 3 MG Tab  Active   Memantine HCl ER (NAMENDA XR) 28 MG CAPSULE SR 24 HR  Active   mirtazapine (REMERON) 15 MG Tab  Active   simvastatin (ZOCOR) 20 MG Tab  Active   tamsulosin (FLOMAX) 0.4 MG capsule  Active                ALLERGIES  Allergies   Allergen Reactions   • Zolpidem Unspecified     Daytime confusion         PHYSICAL EXAM  VITAL SIGNS: BP (!) 163/85   Pulse 81   Temp 36.8 °C (98.2 °F) (Temporal)   Resp 17   Ht 1.829 m (6')   Wt 72 kg (158 lb 11.7 oz)   SpO2 97%   BMI 21.53 kg/m²    Gen: Left gaze deviation, head turn to the left, vomitus noted the patient's mouth and closed.  Somnolent  HEENT: No signs of trauma, Bilateral external ears normal, Nose normal. Conjunctiva normal, Non-icteric.  PERRLA  Neck:  Supple, No masses  Lymphatic: No cervical lymphadenopathy noted.   Cardiovascular: Regular rate and rhythm, no murmurs.   Thorax & Lungs: Bilateral chest rise, breath sounds appear equal bilaterally  Abdomen: Bowel sounds normal, Soft, No tenderness, No masses, No pulsatile masses. No Guarding or rebound  Skin: Warm, Dry.  Right-sided parietal scalp abrasion noted.  Extremities: Intact distal pulses, No edema  Neurologic: Somnolent, right facial droop, drooling, gaze appears deviated to the left.  Patient unable to participate in any further exam        INITIAL IMPRESSION  Patient seen and evaluated at the charge desk and had exam findings suggestive of a very large stroke with right hemiparesis and decreased level consciousness.  The patient had apparently vomited as well raising the possibility of an intraparenchymal hemorrhage.  Patient was taken directly to CT imaging from the  charge desk    LABS  Results for orders placed or performed during the hospital encounter of 04/26/19   CBC WITH DIFFERENTIAL   Result Value Ref Range    WBC 22.6 (H) 4.8 - 10.8 K/uL    RBC 4.32 (L) 4.70 - 6.10 M/uL    Hemoglobin 13.8 (L) 14.0 - 18.0 g/dL    Hematocrit 41.5 (L) 42.0 - 52.0 %    MCV 96.1 81.4 - 97.8 fL    MCH 31.9 27.0 - 33.0 pg    MCHC 33.3 (L) 33.7 - 35.3 g/dL    RDW 48.9 35.9 - 50.0 fL    Platelet Count 275 164 - 446 K/uL    MPV 10.4 9.0 - 12.9 fL    Neutrophils-Polys 92.90 (H) 44.00 - 72.00 %    Lymphocytes 2.40 (L) 22.00 - 41.00 %    Monocytes 4.00 0.00 - 13.40 %    Eosinophils 0.00 0.00 - 6.90 %    Basophils 0.20 0.00 - 1.80 %    Immature Granulocytes 0.50 0.00 - 0.90 %    Nucleated RBC 0.00 /100 WBC    Neutrophils (Absolute) 21.00 (H) 1.82 - 7.42 K/uL    Lymphs (Absolute) 0.55 (L) 1.00 - 4.80 K/uL    Monos (Absolute) 0.91 (H) 0.00 - 0.85 K/uL    Eos (Absolute) 0.00 0.00 - 0.51 K/uL    Baso (Absolute) 0.04 0.00 - 0.12 K/uL    Immature Granulocytes (abs) 0.11 0.00 - 0.11 K/uL    NRBC (Absolute) 0.00 K/uL   COMP METABOLIC PANEL   Result Value Ref Range    Sodium 143 135 - 145 mmol/L    Potassium 3.8 3.6 - 5.5 mmol/L    Chloride 109 96 - 112 mmol/L    Co2 22 20 - 33 mmol/L    Anion Gap 12.0 (H) 0.0 - 11.9    Glucose 122 (H) 65 - 99 mg/dL    Bun 20 8 - 22 mg/dL    Creatinine 0.63 0.50 - 1.40 mg/dL    Calcium 9.9 8.5 - 10.5 mg/dL    AST(SGOT) 28 12 - 45 U/L    ALT(SGPT) 27 2 - 50 U/L    Alkaline Phosphatase 91 30 - 99 U/L    Total Bilirubin 0.7 0.1 - 1.5 mg/dL    Albumin 4.1 3.2 - 4.9 g/dL    Total Protein 6.3 6.0 - 8.2 g/dL    Globulin 2.2 1.9 - 3.5 g/dL    A-G Ratio 1.9 g/dL   PROTHROMBIN TIME   Result Value Ref Range    PT 14.1 12.0 - 14.6 sec    INR 1.07 0.87 - 1.13   APTT   Result Value Ref Range    APTT 30.3 24.7 - 36.0 sec   COD (ADULT)   Result Value Ref Range    ABO Grouping Only A     Rh Grouping Only POS     Antibody Screen-Cod NEG    TROPONIN   Result Value Ref Range    Troponin I 0.02  0.00 - 0.04 ng/mL   URINALYSIS CULTURE, IF INDICATED   Result Value Ref Range    Color Yellow     Character Clear     Ph 6.0 5.0 - 8.0    Glucose Negative Negative mg/dL    Ketones 80 (A) Negative mg/dL    Protein Negative Negative mg/dL    Bilirubin Negative Negative    Urobilinogen, Urine 1.0 Negative    Nitrite Negative Negative    Leukocyte Esterase Negative Negative    Occult Blood Moderate (A) Negative    Micro Urine Req Microscopic    ESTIMATED GFR   Result Value Ref Range    GFR If African American >60 >60 mL/min/1.73 m 2    GFR If Non African American >60 >60 mL/min/1.73 m 2   REFRACTOMETER SG   Result Value Ref Range    Specific Gravity >1.045    URINE MICROSCOPIC (W/UA)   Result Value Ref Range    WBC 2-5 (A) /hpf    RBC 10-20 (A) /hpf    Bacteria Negative None /hpf    Epithelial Cells Rare /hpf   Lipid Profile   Result Value Ref Range    Cholesterol,Tot 143 100 - 199 mg/dL    Triglycerides 44 0 - 149 mg/dL    HDL 60 >=40 mg/dL    LDL 74 <100 mg/dL   Basic Metabolic Panel   Result Value Ref Range    Sodium 142 135 - 145 mmol/L    Potassium 3.6 3.6 - 5.5 mmol/L    Chloride 111 96 - 112 mmol/L    Co2 20 20 - 33 mmol/L    Glucose 200 (H) 65 - 99 mg/dL    Bun 18 8 - 22 mg/dL    Creatinine 0.64 0.50 - 1.40 mg/dL    Calcium 9.3 8.5 - 10.5 mg/dL    Anion Gap 11.0 0.0 - 11.9   ESTIMATED GFR   Result Value Ref Range    GFR If African American >60 >60 mL/min/1.73 m 2    GFR If Non African American >60 >60 mL/min/1.73 m 2   ISTAT LACTATE   Result Value Ref Range    iStat Lactate 1.5 0.5 - 2.0 mmol/L   ACCU-CHEK GLUCOSE   Result Value Ref Range    Glucose - Accu-Ck 180 (H) 65 - 99 mg/dL   EKG (NOW)   Result Value Ref Range    Report       [facility] Emergency Dept.    Test Date:  2019  Pt Name:    SIDDHARTHA DIALLO             Department: ER  MRN:        [id number]                      Room:       [facility]  Gender:     Male                         Technician: [id number]  :        [id number]                    Requested By:BRYAN RODRIGUEZ  Order #:    [id number]                    Reading MD: Bryan Rodriguez MD    Measurements  Intervals                                Axis  Rate:       99                           P:          82  MI:         184                          QRS:        73  QRSD:       94                           T:          27  QT:         364  QTc:        468    Interpretive Statements  SINUS RHYTHM  PROBABLE LEFT ATRIAL ABNORMALITY  BORDERLINE INFERIOR Q WAVES  Compared to ECG 05/10/2018 09:50:23  No significant changes    Electronically Signed On 4- 22:51:21 PDT by Bryan Rodriguez MD     ISTAT ARTERIAL BLOOD GAS   Result Value Ref Range    Ph 7.384 (L) 7.400 - 7.500    Pco2 33.7 26.0 - 37.0 mmHg    Po2 130 (H) 64 - 87 mmHg    Tco2 21 20 - 33 mmol/L    S02 99 93 - 99 %    Hco3 20.1 17.0 - 25.0 mmol/L    BE -4 -4 - 3 mmol/L    Body Temp see below degrees    O2 Therapy 60 %    iPF Ratio 217     Specimen Arterial     Action Range Triggered NO     Inst. Qualified Patient YES        RADIOLOGY  DX-CHEST-FOR LINE PLACEMENT Perform procedure in: Patient's Room   Final Result            1. A right peripherally inserted catheter with tip projects appropriately over the expected area of the superior vena cava.      DX-CHEST-PORTABLE (1 VIEW)   Final Result         1. Intubated. No pulmonary infiltrates or consolidations are noted.      DX-ABDOMEN FOR TUBE PLACEMENT   Final Result         Feeding tube with tip projecting over the expected area of the stomach fundus.      CT-CTA HEAD WITH & W/O-POST PROCESS   Final Result         1. There is active hemorrhage within the large parenchymal hematoma in the left basal ganglia.         CRITICAL RESULT READ BACK: Preliminary findings discussed with and critical read back performed by Dr. BRYAN RODRIGUEZ in the Emergency Department via telephone on 4/26/2019 9:22 PM      CT-HEAD W/O   Final Result         1. Large parenchymal hemorrhage in the left basal ganglia  extending to the left lateral ventricle and right lateral ventricle.      2. Dilated lateral ventricle, concerning for obstructive hydrocephalus.      3. Left to right midline shift of 7 mm.         CRITICAL RESULT READ BACK: Preliminary findings discussed with and critical read back performed by Dr. BRYAN RODRIGUEZ in the Emergency Department via telephone on 4/26/2019 9:15 PM      CT-CTA NECK WITH & W/O-POST PROCESSING   Final Result      1. No evidence of flow-limiting stenosis in the cervical carotid or cervical vertebral arteries.      CT-CEREBRAL PERFUSION ANALYSIS   Final Result      1.  Cerebral blood flow less than 30% = 82 mL.      2.  T Max more than 6 seconds = 201 mL.      3.  Mismatched volume = 119      4.  These abnormal areas correspond to the hemorrhage seen on CT      DX-CHEST-PORTABLE (1 VIEW)    (Results Pending)       Time: 2115  Indication: Altered mental status, respiratory failure, airway protection  A timeout was completed verifying correct patient information. Patient was placed in the supine position. Sedation was achieved using Benjamin for induction and succinylcholine for paralysis. The patient was ventilated with bag-valve-mask during this time. Using a #3 glide scope , the glottis was visualized, and a 8.0 endotracheal tube was inserted while visualizing the vocal cords. Stylette was removed. Colorimetric changes visualized on the end-tidal CO2 indicator. No breath sounds were heard in the epigastrium, bilateral breath sounds were heard in lung fields. There was bilateral chest rise. Endotracheal tube was secured at 23 centimeters at the teeth/gums. Chest x-ray confirmed placement. There were no complications.    Reevaluation   Time:9:58 PM  Vital signs: Blood pressures with a systolic over 150.  Heart rate over 100  Assessment: Patient getting somewhat restless will increase propofol and start him on a labetalol drip for blood pressure control.  Discussed the case with the neurosurgeon  who agreed that case appears futile and the patient is not a surgical candidate.      Reevaluation   Time:10:50 PM  Vital signs: Blood pressure still a bit high will titrate up the nicardipine  Assessment: Less restless now, still has some agonal respiratory effort against the ventilator    Critical Care Note  Upon my evaluation, this patient had high probability of imminent and life-threatening deterioration due to and a cerebral hemorrhage, which required my direct attention, intervention, and personal management. I personally provided 35 minutes of critical care time exclusive of time spent on separately billable procedures. Time includes review of laboratory data, radiology results, discussion with consultants, and monitoring for potential decompensation.       COURSE & MEDICAL DECISION MAKING  Pertinent Labs & Imaging studies reviewed. (See chart for details)  Patient arrives with what appears to be a very large left-sided intraparenchymal hemorrhage.  His altered level of consciousness was likely due to combination of hemorrhage being on the dominant side of his brain as well as increased ICP.  Although he is not herniating, he did have a decrease in his level of consciousness in the emergency department and required intubation for airway protection.  Patient has a court appointed guardian who will likely be consulted in the morning.  At the time of admission, there was no surgical intervention and that would benefit patient and it is likely that he will need transition to comfort care.  He did remain hemodynamically stable if somewhat hypertensive despite a nicardipine drip and respiratorily stable as well.    FINAL IMPRESSION  1. Nontraumatic cortical hemorrhage of left cerebral hemisphere (HCC)    2. Acute respiratory failure with hypoxia (HCC)    3. Acute spontaneous intraparenchymal intracranial hemorrhage associated with hypertension (HCC)    4. Acute hypoxemic respiratory failure (HCC)         Electronically signed by: Constantin Lehman, 4/26/2019 9:10 PM

## 2019-04-27 NOTE — H&P
Hospital Medicine History & Physical Note    Date of Service  4/26/2019    Primary Care Physician  Rick Wang M.D.    Consultants  [facility]  neuro    Code Status  full    Chief Complaint  aloc    History of Presenting Illness  78 y.o. male who presented 4/26/2019 with past medical history of coronary artery disease dementia Alzheimer's hypothyroidism hypertension hyperlipidemia presents with altered level of consciousness.  This patient was found down by staff with his head wedged up against a wall.  He had a large abrasion to the top right of his head.  In the emergency department he was found to be significantly altered and subsequently intubated.  He is found to have massive intracranial hemorrhage and will be admitted to the ICU with neurology and neurosurgery consultation.    Review of Systems  Review of Systems   Unable to perform ROS: Mental status change       Past Medical History   has a past medical history of CAD (coronary artery disease) (1990); Cancer ([facility]); Chronic autoimmune thyroiditis; Dementia in Alzheimer's disease; Dental disorder; High cholesterol; Hypertension; Hypothyroidism; MEDICAL HOME; Melanoma in situ of ear ([facility]); Myocardial infarct ([facility]); Pain; Pericarditis; Personal history of venous thrombosis and embolism (5/2012); S/P orchiectomy; Stroke ([facility]) (5/2012); and Urinary incontinence.    Surgical History   has a past surgical history that includes other surgical procedure (1990's); mass excision general (8/21/2013); hip hemiarthroplasty (7/1/2014); cataract phaco with iol (4/7/2015); cataract phaco with iol (4/21/2015); other (Right); and hip arthroplasty total (Left, 5/24/2018).     Family History  family history includes Dementia in his father; Hypertension in his unknown relative; Other (age of onset: 90) in his mother.     Social History   reports that he quit smoking about 10 years ago. He has a 45.00 pack-year smoking history. He has never used smokeless tobacco. He reports  that he drinks about 0.5 oz of alcohol per week . He reports that he does not use drugs.    Allergies  Allergies   Allergen Reactions   • Zolpidem Unspecified     Daytime confusion         Medications  Prior to Admission Medications   Prescriptions Last Dose Informant Patient Reported? Taking?   Cholecalciferol (VITAMIN D) 2000 UNIT Tab   No No   Sig: TAKE 1 TABLET BY MOUTH ONCE DAILY.   Diclofenac Sodium (VOLTAREN) 1 % Gel  Caregiver No No   Sig: Apply 1 Inch to skin as directed 4 times a day.   Memantine HCl ER (NAMENDA XR) 28 MG CAPSULE SR 24 HR   No No   Sig: Take 1 Cap by mouth every day.   celecoxib (CELEBREX) 200 MG Cap   No No   Sig: Take 1 Cap by mouth every day.   clopidogrel (PLAVIX) 75 MG Tab   No No   Sig: Take 1 Tab by mouth every day.   donepezil (ARICEPT) 10 MG tablet   No No   Sig: TAKE 1 TABLET BY MOUTH ONCE DAILY.   lisinopril (PRINIVIL) 20 MG Tab   No No   Sig: Take 1 Tab by mouth every day.   melatonin 3 MG Tab  Caregiver Yes No   Sig: Take 3 mg by mouth every bedtime.   melatonin 3 MG Tab   No No   Sig: TAKE 2 TABLETS (6MG) BY MOUTH AT BEDTIME.   mirtazapine (REMERON) 15 MG Tab   No No   Sig: Take 1 Tab by mouth every bedtime.   simvastatin (ZOCOR) 20 MG Tab   No No   Sig: Take 1 Tab by mouth every evening.   tamsulosin (FLOMAX) 0.4 MG capsule   No No   Sig: Take 1 Cap by mouth ONE-HALF HOUR AFTER BREAKFAST.      Facility-Administered Medications: None       Physical Exam  Temp:  [37 °C (98.6 °F)] 37 °C (98.6 °F)  Pulse:  [] 103  Resp:  [20] 20  BP: (163)/(85) 163/85  SpO2:  [91 %-100 %] 98 %    Physical Exam   Constitutional: He appears well-developed and well-nourished. He appears distressed.   HENT:   Head: Normocephalic.   Right-sided abrasion forehead with intubation   Eyes: Pupils are equal, round, and reactive to light. Conjunctivae and EOM are normal.   Neck: Neck supple. No JVD present.   Cardiovascular: Normal rate, regular rhythm, normal heart sounds and intact distal pulses.     No murmur heard.  Pulmonary/Chest: Effort normal and breath sounds normal. No respiratory distress. He exhibits no tenderness.   Abdominal: Soft. Bowel sounds are normal. He exhibits no distension. There is no tenderness.   Musculoskeletal: He exhibits no edema or tenderness.   Neurological: He displays normal reflexes. He exhibits normal muscle tone.   sedated   Skin: Skin is warm and dry. No erythema.   Psychiatric:   Sedated unable to return   Nursing note and vitals reviewed.      Laboratory:  Recent Labs      04/26/19 2101   WBC  22.6*   RBC  4.32*   HEMOGLOBIN  13.8*   HEMATOCRIT  41.5*   MCV  96.1   MCH  31.9   MCHC  33.3*   RDW  48.9   PLATELETCT  275   MPV  10.4     Recent Labs      04/26/19   2101   SODIUM  143   POTASSIUM  3.8   CHLORIDE  109   CO2  22   GLUCOSE  122*   BUN  20   CREATININE  0.63   CALCIUM  9.9     Recent Labs      04/26/19   2101   ALTSGPT  27   ASTSGOT  28   ALKPHOSPHAT  91   TBILIRUBIN  0.7   GLUCOSE  122*     Recent Labs      04/26/19 2101   APTT  30.3   INR  1.07             Recent Labs      04/26/19   2101   TROPONINI  0.02       Urinalysis:    Recent Labs      04/26/19 2149   SPECGRAVITY  >1.045   GLUCOSEUR  Negative   KETONES  80*   NITRITE  Negative   LEUKESTERAS  Negative   WBCURINE  2-5*   RBCURINE  10-20*   BACTERIA  Negative   EPITHELCELL  Rare        Imaging:  DX-ABDOMEN FOR TUBE PLACEMENT   Final Result         Feeding tube with tip projecting over the expected area of the stomach fundus.      CT-CTA HEAD WITH & W/O-POST PROCESS   Final Result         1. There is active hemorrhage within the large parenchymal hematoma in the left basal ganglia.         CRITICAL RESULT READ BACK: Preliminary findings discussed with and critical read back performed by Dr. BRYAN RODRIGUEZ in the Emergency Department via telephone on 4/26/2019 9:22 PM      CT-HEAD W/O   Final Result         1. Large parenchymal hemorrhage in the left basal ganglia extending to the left lateral ventricle  and right lateral ventricle.      2. Dilated lateral ventricle, concerning for obstructive hydrocephalus.      3. Left to right midline shift of 7 mm.         CRITICAL RESULT READ BACK: Preliminary findings discussed with and critical read back performed by Dr. BRYAN RODRIGUEZ in the Emergency Department via telephone on 4/26/2019 9:15 PM      CT-CTA NECK WITH & W/O-POST PROCESSING   Final Result      1. No evidence of flow-limiting stenosis in the cervical carotid or cervical vertebral arteries.      CT-CEREBRAL PERFUSION ANALYSIS   Final Result      1.  Cerebral blood flow less than 30% = 82 mL.      2.  T Max more than 6 seconds = 201 mL.      3.  Mismatched volume = 119      4.  These abnormal areas correspond to the hemorrhage seen on CT      DX-CHEST-PORTABLE (1 VIEW)    (Results Pending)   DX-CHEST-PORTABLE (1 VIEW)    (Results Pending)         Assessment/Plan:  I anticipate this patient will require at least two midnights for appropriate medical management, necessitating inpatient admission.    * Acute spontaneous intraparenchymal intracranial hemorrhage associated with hypertension (HCC)- (present on admission)   Assessment & Plan    Large intraparenchymal hemorrhage  NSG consulted, no intervention at this time   Neuro consulted  Nicardipine started to keep SBP <140  Neuro checks hourly  On plavix checking teg with platelet mapping  ICP monitoring   Pt/ot/speech eval when able   PMA consulted   Keppra 500 BID for seizure prophylaxis        Aspiration into airway   Assessment & Plan    On unasyn for now   Follow cultures  descialte therapy as appropriate     Acute hypoxemic respiratory failure (HCC)   Assessment & Plan    Due to altered level of conciousness   Intubated 4/26/2019   PMA consutled     Chronic obstructive pulmonary disease (HCC)- (present on admission)   Assessment & Plan    resp care protocol   Not in acute exacerbation      Dementia associated with other underlying disease without behavioral  disturbance- (present on admission)   Assessment & Plan    On namenda and donepizil      Pulmonary hypertension (HCC)- (present on admission)   Assessment & Plan    History of with rvsp 25-30     Essential hypertension- (present on admission)   Assessment & Plan    On nicardipine to keep SBP<140          VTE prophylaxis: scd   Artificial pacemaker  defib

## 2019-04-28 NOTE — PROGRESS NOTES
RCC    Serial follow-up exams revealed no improvement effect possible deterioration in neurologic function, patient not quite brain-dead by clinical criteria but has very little brainstem activity and may be having some ongoing seizure activity despite Keppra and propofol.  Case management/ has been unsuccessful in tracking down any family.  Neurosurgery recommends no intervention.  Images of brain reviewed with hospitalist and current hospital course, prognosis and plan discussed with hospitalist at length.  Patient appears to have a irreversible and terminally ill intracranial hemorrhage for which there is no treatment at this time.  Compassionate transition to comfort care is medically and morally appropriate.  I agree with transition to comfort care.  Reviewed with entire ICU team and all concur.  RCC will sign off.

## 2019-04-28 NOTE — PROGRESS NOTES
[facility] Medicine Daily Progress Note    Date of Service  4/28/2019    Chief Complaint  78 y.o. male admitted 4/26/2019 with large ICH after being found down at care facility, presented with obtundation.    Hospital Course    Patient intubated in ED d/t inability to protect airway.  Ct head performed and large ICH found.  Patient had been on cardene gtt and neurosurgery consulted but there was no role in surgical intervention with his severe dementia and poor function at baseline.  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  As no family could be found, patient was made comfort care due to poor prognosis by 2 physicians.       Interval Problem Update  Patient transferred from ICU to medical floor for continuation of comfort care.  He was extubated on 4/27.  Patient obtunded at this time.      Consultants/Specialty  pulm - s/o  Neuro - s/o  NSG - s/o    Code Status  Comfort care/DNR    Disposition  Patient will pass away here in the hospital    Review of Systems  Review of Systems   Unable to perform ROS: Patient unresponsive        Physical Exam  Temp:  [36.8 °C (98.3 °F)-37.2 °C (98.9 °F)] 37.2 °C (98.9 °F)  Pulse:  [] 71  Resp:  [18-66] 21  BP: (60)/(42) 60/42  SpO2:  [85 %-97 %] 85 %    Physical Exam   Constitutional: He appears well-developed and well-nourished. No distress.   HENT:   Head: Normocephalic and atraumatic.   Eyes: Conjunctivae are normal. No scleral icterus.   Neck: Neck supple. No JVD present.   Cardiovascular: Normal rate, regular rhythm and normal heart sounds.  Exam reveals no gallop and no friction rub.    No murmur heard.  Pulmonary/Chest: Effort normal and breath sounds normal. No respiratory distress. He has no wheezes. He exhibits no tenderness.   tachypnea     Abdominal: Soft. Bowel sounds are normal. He exhibits no distension and no mass. There is no tenderness.    Musculoskeletal: He exhibits no edema or tenderness.   Lymphadenopathy:     He has no cervical adenopathy.   Neurological: No cranial nerve deficit.   Skin: Skin is warm and dry. He is not diaphoretic. No erythema. No pallor.   Psychiatric: He has a normal mood and affect. His behavior is normal.   Nursing note and vitals reviewed.      Fluids    Intake/Output Summary (Last 24 hours) at 04/28/19 0924  Last data filed at 04/28/19 0300   Gross per 24 hour   Intake           796.61 ml   Output              660 ml   Net           136.61 ml       Laboratory  Recent Labs      04/26/19 2101 04/27/19   0436   WBC  22.6*  26.7*   RBC  4.32*  4.02*   HEMOGLOBIN  13.8*  12.7*   HEMATOCRIT  41.5*  38.3*   MCV  96.1  95.3   MCH  31.9  31.6   MCHC  33.3*  33.2*   RDW  48.9  48.6   PLATELETCT  275  263   MPV  10.4  10.2     Recent Labs      04/27/19   0010  04/27/19   0436  04/27/19   1031   SODIUM  142  139  145   POTASSIUM  3.6  3.7  5.0   CHLORIDE  111  112  117*   CO2  20  22  22   GLUCOSE  200*  177*  140*   BUN  18  20  21   CREATININE  0.64  0.63  0.61   CALCIUM  9.3  9.0  8.9     Recent Labs      04/26/19 2101   APTT  30.3   INR  1.07         Recent Labs      04/26/19 2101 04/27/19   0436   TRIGLYCERIDE  44  44   HDL  60   --    LDL  74   --        Imaging  DX-CHEST-FOR LINE PLACEMENT Perform procedure in: Patient's Room   Final Result            1. A right peripherally inserted catheter with tip projects appropriately over the expected area of the superior vena cava.      DX-CHEST-PORTABLE (1 VIEW)   Final Result         1. Intubated. No pulmonary infiltrates or consolidations are noted.      DX-ABDOMEN FOR TUBE PLACEMENT   Final Result         Feeding tube with tip projecting over the expected area of the stomach fundus.      CT-CTA HEAD WITH & W/O-POST PROCESS   Final Result         1. There is active hemorrhage within the large parenchymal hematoma in the left basal ganglia.         CRITICAL RESULT READ BACK:  Preliminary findings discussed with and critical read back performed by Dr. BRYAN RODRIGUEZ in the Emergency Department via telephone on 4/26/2019 9:22 PM      CT-HEAD W/O   Final Result         1. Large parenchymal hemorrhage in the left basal ganglia extending to the left lateral ventricle and right lateral ventricle.      2. Dilated lateral ventricle, concerning for obstructive hydrocephalus.      3. Left to right midline shift of 7 mm.         CRITICAL RESULT READ BACK: Preliminary findings discussed with and critical read back performed by Dr. BRYAN RODRIGUEZ in the Emergency Department via telephone on 4/26/2019 9:15 PM      CT-CTA NECK WITH & W/O-POST PROCESSING   Final Result      1. No evidence of flow-limiting stenosis in the cervical carotid or cervical vertebral arteries.      CT-CEREBRAL PERFUSION ANALYSIS   Final Result      1.  Cerebral blood flow less than 30% = 82 mL.      2.  T Max more than 6 seconds = 201 mL.      3.  Mismatched volume = 119      4.  These abnormal areas correspond to the hemorrhage seen on CT           Assessment/Plan  * Acute spontaneous intraparenchymal intracranial hemorrhage associated with hypertension (HCC)- (present on admission)   Assessment & Plan    Large intraparenchymal hemorrhage  Comfort care  Prognosis is grim given size and location of bleed.  Unable to get a hold of family to discuss GOC.           Scalp abrasion- (present on admission)   Assessment & Plan    Comfort care       Aspiration into airway   Assessment & Plan    Comfort care     Acute hypoxemic respiratory failure (HCC)   Assessment & Plan    Comfort care     Chronic obstructive pulmonary disease (HCC)- (present on admission)   Assessment & Plan    Not in acute exacerbation   Comfort care       Dementia associated with other underlying disease without behavioral disturbance- (present on admission)   Assessment & Plan    Dc namenda and donepizil   Comfort care       Pulmonary hypertension (HCC)- (present  on admission)   Assessment & Plan    History of with rvsp 25-30  Comfort care       Essential hypertension- (present on admission)   Assessment & Plan    Off Cardene  Comfort care     CAD (coronary artery disease)- (present on admission)   Assessment & Plan    Comfort care          VTE prophylaxis: Comfort care

## 2019-04-28 NOTE — CARE PLAN
Problem: Safety  Goal: Will remain free from injury  Hourly rounding in effect, pt instructed to call for assistance, bed locked and in lowest position. Bed alarm on. Room close to nurses station.       Problem: Respiratory:  Goal: Respiratory status will improve  Morphine administered per MAR for air hunger.    Problem: Skin Integrity  Goal: Risk for impaired skin integrity will decrease  Pt turned q2 turns to prevent skin breakdown.

## 2019-04-28 NOTE — PROGRESS NOTES
· 2 RN skin check complete with KIM Chu.  · Devices in place: colón.  · Skin assessed under: colón.  · Confirmed pressure ulcers found on: none.  · New potential pressure ulcers noted on: none.  · The following interventions in place: q turns in place

## 2019-04-28 NOTE — PROGRESS NOTES
Received report from KIM Gramajo in ICU. Pt arrived with transport to unit. Suction set up. Pt is on Comfort Care at this time. Pt shows no signs of distress. Bed alarm on for precautions.

## 2019-04-28 NOTE — PROGRESS NOTES
Pt obtunded. VS: BP (!) 60/42   Pulse 71   Temp 37.2 °C (98.9 °F) (Temporal)   Resp (!) 21   Ht 1.829 m (6')   Wt 72 kg (158 lb 11.7 oz)   SpO2 (!) 85%   BMI 21.53 kg/m² . Pt resting comfortably at this time. Ann to gravity. PIV patent with positive blood return. Oral care provided. Pt needs met at this time, call light within reach, hourly rounding in effect, and will continue to monitor.

## 2019-04-29 NOTE — PALLIATIVE CARE
Spiritual Care Note    Patient Information     Patient's Name: Kailash Haile   MRN: [id number]    YOB: 1940   Age and Gender: 78 y.o. male   Unit: Cancer Nursing Unit   Room (and Bed): R306   Ethnicity or Nationality: white   Primary Language: English   Voodoo/Spiritual Preference: none   Place of Residence: [place], [place]   Medical Diagnosis(-es)/Procedure(s): acute spontaneous intraparenchymal intracranial hemorrhage associated with hypertension   acute hypoxemic respiratory failure   chronic obstructive pulmonary disease   dementia associated with other underlying disease without behavioral disturbance  pulmonary hypertension   essential hypertension  coronary artery disease   Code Status: Comfort Care/DNR    Date of Admission: 4/26/2019   Length of Stay: 3 days      Request Information  Nature of the Visit:     Initial, On shift  Crisis Visit:      Patient actively dying/EOL  Consult:      Palliative care team  Referral from/Origin of Request:   [facility] nursing    Encounter Information  Visited with:      Patient  Observations/Symptoms:     Patient obtunded.  Interacton/Conversation:    Spoke words of comfort and peace to patient and prayed.  Assessment:      Need  Need:       Seeking Spiritual Assistance and Support  Voodoo Needs     Visit  Interventions:      Compassionate presence, emotional support, prayer  Continue Visiting:      Upon request  Total Time:      15 minutes    Spiritual Care Provider Information  Title of Spiritual Care Provider:    Name of Spiritual Care Provider:   ISAURA Diego  Phone Number:     [phone]   E-Mail:       [email or web address]

## 2019-04-29 NOTE — PALLIATIVE CARE
Palliative Care follow-up  Received call from Bedside RN with request for a visit to the pt as he has no family. Left a message with [facility]  with request to please see pt.     Thank you for allowing Palliative Care to participate in this patient's care. Please feel free to call [phone] with any questions or concerns.

## 2019-04-29 NOTE — DISCHARGE PLANNING
Medical Social Work    LSW left VM for palliative care team to refer for No One Dies Alone program.

## 2019-04-29 NOTE — PROGRESS NOTES
Pt with increased work of breathing and air hunger, not relieved by 4 mg IV morphine. New orders from on call hospitalist for sublingual medications--ativan 2 mg every 2 hours prn and sublingual morphine 10-20 mg every 2 hours prn.

## 2019-04-29 NOTE — PROGRESS NOTES
Paged on call hospitalist for medications to help alleviate secretions. New orders received for SL atropine drops and robinul 0.2 mg IV every four hours prn.

## 2019-04-29 NOTE — PROGRESS NOTES
Pt on comfort care measures in place. VS: /85   Pulse 85   Temp 37.7 °C (99.9 °F) (Temporal)   Resp (!) 31   Ht 1.829 m (6')   Wt 72 kg (158 lb 11.7 oz)   SpO2 (!) 78%   BMI 21.53 kg/m² . Pt having tachypnea and air hunger, morphine administered per MAR. Pt needs met at this time, call light within reach, hourly rounding in effect, and will continue to monitor.

## 2019-04-29 NOTE — PROGRESS NOTES
Left voicemail for palliative team at [phone] to initiate no one dies alone program. Also, called hospice at [phone], unfortunately they do not have sitters available on weekends.

## 2019-04-29 NOTE — CARE PLAN
Problem: Respiratory:  Goal: Respiratory status will improve  Morphine given for air hunger    Problem: Skin Integrity  Goal: Risk for impaired skin integrity will decrease  Pt turned q2 hours to prevent skin breakdown.

## 2019-04-29 NOTE — PROGRESS NOTES
Pt obtunded, still with increased work of breathing, use of accessory muscles. Medications per MAR for comfort. Gave patient a bed bath with staff, new linens, and clean gown. Oral care prn. Room close to nurses station. Comfort and safety measures in place. All needs met at this time.

## 2019-04-29 NOTE — PROGRESS NOTES
[facility] Medicine Daily Progress Note    Date of Service  4/29/2019    Chief Complaint  78 y.o. male admitted 4/26/2019 with large ICH after being found down at care facility, presented with obtundation.    Hospital Course    Patient intubated in ED d/t inability to protect airway.  Ct head performed and large ICH found.  Patient had been on cardene gtt and neurosurgery consulted but there was no role in surgical intervention with his severe dementia and poor function at baseline.  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  As no family could be found, patient was made comfort care due to poor prognosis by 2 physicians.       Interval Problem Update  4/28 Patient transferred from ICU to medical floor for continuation of comfort care.  He was extubated on 4/27.  Patient obtunded at this time.    4/29 Patient doing seemingly same, no acute distress and still with tachypnea but no signs of discomfort.  Will continue with comfort measures.    Consultants/Specialty  pulm - s/o  Neuro - s/o  NSG - s/o    Code Status  Comfort care/DNR    Disposition  Patient will pass away here in the hospital    Review of Systems  Review of Systems   Unable to perform ROS: Patient unresponsive        Physical Exam  Temp:  [37.7 °C (99.8 °F)-37.7 °C (99.9 °F)] 37.7 °C (99.9 °F)  Pulse:  [85-86] 85  Resp:  [22-32] 31  BP: (103-135)/(65-85) 135/85  SpO2:  [78 %-84 %] 78 %    Physical Exam   Constitutional: He appears well-developed and well-nourished. No distress.   HENT:   Head: Normocephalic and atraumatic.   Eyes: Conjunctivae are normal. No scleral icterus.   Neck: Neck supple. No JVD present.   Cardiovascular: Normal rate, regular rhythm and normal heart sounds.  Exam reveals no gallop and no friction rub.    No murmur heard.  Pulmonary/Chest: Effort normal and breath sounds normal. No respiratory distress. He has no  wheezes. He exhibits no tenderness.   tachypnea     Abdominal: Soft. Bowel sounds are normal. He exhibits no distension and no mass. There is no tenderness.   Musculoskeletal: He exhibits no edema or tenderness.   Lymphadenopathy:     He has no cervical adenopathy.   Neurological: No cranial nerve deficit.   Skin: Skin is warm and dry. He is not diaphoretic. No erythema. No pallor.   Psychiatric: He has a normal mood and affect. His behavior is normal.   Nursing note and vitals reviewed.      Fluids    Intake/Output Summary (Last 24 hours) at 04/29/19 1329  Last data filed at 04/29/19 0324   Gross per 24 hour   Intake                0 ml   Output              700 ml   Net             -700 ml       Laboratory  Recent Labs      04/26/19 2101 04/27/19   0436   WBC  22.6*  26.7*   RBC  4.32*  4.02*   HEMOGLOBIN  13.8*  12.7*   HEMATOCRIT  41.5*  38.3*   MCV  96.1  95.3   MCH  31.9  31.6   MCHC  33.3*  33.2*   RDW  48.9  48.6   PLATELETCT  275  263   MPV  10.4  10.2     Recent Labs      04/27/19   0010  04/27/19   0436  04/27/19   1031   SODIUM  142  139  145   POTASSIUM  3.6  3.7  5.0   CHLORIDE  111  112  117*   CO2  20  22  22   GLUCOSE  200*  177*  140*   BUN  18  20  21   CREATININE  0.64  0.63  0.61   CALCIUM  9.3  9.0  8.9     Recent Labs      04/26/19 2101   APTT  30.3   INR  1.07         Recent Labs      04/26/19 2101 04/27/19 0436   TRIGLYCERIDE  44  44   HDL  60   --    LDL  74   --        Imaging  DX-CHEST-FOR LINE PLACEMENT Perform procedure in: Patient's Room   Final Result            1. A right peripherally inserted catheter with tip projects appropriately over the expected area of the superior vena cava.      DX-CHEST-PORTABLE (1 VIEW)   Final Result         1. Intubated. No pulmonary infiltrates or consolidations are noted.      DX-ABDOMEN FOR TUBE PLACEMENT   Final Result         Feeding tube with tip projecting over the expected area of the stomach fundus.      CT-CTA HEAD WITH & W/O-POST  PROCESS   Final Result         1. There is active hemorrhage within the large parenchymal hematoma in the left basal ganglia.         CRITICAL RESULT READ BACK: Preliminary findings discussed with and critical read back performed by Dr. BRYAN RODRIGUEZ in the Emergency Department via telephone on 4/26/2019 9:22 PM      CT-HEAD W/O   Final Result         1. Large parenchymal hemorrhage in the left basal ganglia extending to the left lateral ventricle and right lateral ventricle.      2. Dilated lateral ventricle, concerning for obstructive hydrocephalus.      3. Left to right midline shift of 7 mm.         CRITICAL RESULT READ BACK: Preliminary findings discussed with and critical read back performed by Dr. BRYAN RODRIGUEZ in the Emergency Department via telephone on 4/26/2019 9:15 PM      CT-CTA NECK WITH & W/O-POST PROCESSING   Final Result      1. No evidence of flow-limiting stenosis in the cervical carotid or cervical vertebral arteries.      CT-CEREBRAL PERFUSION ANALYSIS   Final Result      1.  Cerebral blood flow less than 30% = 82 mL.      2.  T Max more than 6 seconds = 201 mL.      3.  Mismatched volume = 119      4.  These abnormal areas correspond to the hemorrhage seen on CT           Assessment/Plan  * Acute spontaneous intraparenchymal intracranial hemorrhage associated with hypertension (HCC)- (present on admission)   Assessment & Plan    Large intraparenchymal hemorrhage  Comfort care  Prognosis is grim given size and location of bleed.  Unable to get a hold of family to discuss GOC.           Scalp abrasion- (present on admission)   Assessment & Plan    Comfort care       Aspiration into airway   Assessment & Plan    Comfort care     Acute hypoxemic respiratory failure (HCC)   Assessment & Plan    Comfort care     Chronic obstructive pulmonary disease (HCC)- (present on admission)   Assessment & Plan    Not in acute exacerbation   Comfort care       Dementia associated with other underlying disease  without behavioral disturbance- (present on admission)   Assessment & Plan    Dc namenda and donepizil   Comfort care       Pulmonary hypertension (HCC)- (present on admission)   Assessment & Plan    History of with rvsp 25-30  Comfort care       Essential hypertension- (present on admission)   Assessment & Plan    Off Cardene  Comfort care     CAD (coronary artery disease)- (present on admission)   Assessment & Plan    Comfort care          VTE prophylaxis: Comfort care

## 2019-04-30 NOTE — PROGRESS NOTES
Report received from [facility] nurse, assumed care of pt at 0700.  Pt is obtunded with tachypnea, increased work of breathing and accessory muscle use noted. Repositioning and comfort care medication per MAR provided. Cool washcloth placed on forehead. Ann in place draining to gravity. 24 g PIV to left wrist CDI. Oral care PRN.   Comfort and safety measures in place, room close to nursing station, will continue to monitor closely.

## 2019-04-30 NOTE — CARE PLAN
Problem: Safety  Goal: Will remain free from injury  Hourly rounding in effect, pt instructed to call for assistance, bed locked and in lowest position. Bed alarm on. Room close to nurses' station.       Problem: Pain Management  Goal: Pain level will decrease to patient's comfort goal  Monitoring for signs of distress and increased work of breathing. Administering morphine PRN.    Problem: Skin Integrity  Goal: Risk for impaired skin integrity will decrease  q2 turns in place to prevent skin breakdown

## 2019-04-30 NOTE — PROGRESS NOTES
Pt obtunded with tachpnea. Cool washcloth to forehead. Oral care prn. Repositioning for comfort. Ann to gravity. Comfort care medications per MAR. Room close to nurses station. Monitoring pt closely.

## 2019-04-30 NOTE — PROGRESS NOTES
2 RN pronounced at 1220. Charge nurse, physician, , guardian, and tissue bank notified.   Tissue bank referral #: [id number].   IV removed, colón removed. [place] [facility] [place]

## 2019-04-30 NOTE — RESPIRATORY CARE
COPD EDUCATION by COPD CLINICAL EDUCATOR  4/30/2019 at 7:04 AM by Chayo Maldonado     Patient reviewed by COPD education team. Patient does not qualify for the COPD program.

## 2019-04-30 NOTE — DISCHARGE SUMMARY
Death note    CHIEF COMPLAINT ON ADMISSION  Chief Complaint   Patient presents with   • ALOC     Last known well @ 2000       Reason for Admission  Stroke     Admission Date  4/26/2019    CODE STATUS  Comfort Care/DNR    HPI & HOSPITAL COURSE  This is a 78 y.o. male here with  past medical history of coronary artery disease dementia Alzheimer's hypothyroidism hypertension hyperlipidemia presents with altered level of consciousness. He presented with a large abrasion on the head.  He was found to be hypertensive, tachycardic.  CT of the head found a large parenchymal hemorrhage in the left basal ganglia extending to the left lateral ventricle and right lateral ventricle.  He was subsequently intubated in the ED.  Placed on labetalol and propofol.  Neurosurgery was consulted.  Neurosurgery recommended since he has dementia and poor functional baseline they would recommend comfort care and no neurological intervention was needed.  He was subsequently started on nicardipine and hypertonic saline.  Keppra was started as a prophylaxis. Case management/ has been unsuccessful in tracking down any family. Patient appears to have a irreversible and terminally ill intracranial hemorrhage for which there is no treatment at this time. He was transitioned to comfort care and was agreed by 2 physicians. The patient passed away on 4/30/2019 at 12:20pm.        The cause of death was traumatic brain injury causing a large intraparenchymal hemorrhage causing neurological arrest.  DISCHARGE DIAGNOSES  Principal Problem:    Acute spontaneous intraparenchymal intracranial hemorrhage associated with hypertension (HCC) POA: Yes  Active Problems:    CAD (coronary artery disease) POA: Yes      Overview:  S/P acute MI1990s    Essential hypertension POA: Yes    Pulmonary hypertension (HCC) POA: Yes      Overview: With RVSP:25-30    Dementia associated with other underlying disease without behavioral disturbance POA: Yes    Chronic  obstructive pulmonary disease (HCC) POA: Yes    Acute hypoxemic respiratory failure (HCC) POA: Unknown    Aspiration into airway POA: Unknown    Scalp abrasion POA: Yes  Resolved Problems:    * No resolved hospital problems. *      FOLLOW UP  No future appointments.  No follow-up provider specified.    MEDICATIONS ON DISCHARGE     Medication List      ASK your doctor about these medications      Instructions   celecoxib 200 MG Caps  Commonly known as:  CELEBREX   Take 1 Cap by mouth every day.  Dose:  200 mg     clopidogrel 75 MG Tabs  Commonly known as:  PLAVIX   Take 1 Tab by mouth every day.  Dose:  75 mg     Diclofenac Sodium 1 % Gel  Commonly known as:  VOLTAREN   Apply 1 Inch to skin as directed 4 times a day.  Dose:  1 Inch     donepezil 10 MG tablet  Commonly known as:  ARICEPT   TAKE 1 TABLET BY MOUTH ONCE DAILY.     lisinopril 20 MG Tabs  Commonly known as:  PRINIVIL   Take 1 Tab by mouth every day.  Dose:  20 mg     * melatonin 3 MG Tabs   Take 3 mg by mouth every bedtime.  Dose:  3 mg     * melatonin 3 MG Tabs   TAKE 2 TABLETS (6MG) BY MOUTH AT BEDTIME.     Memantine HCl ER 28 MG Cp24  Commonly known as:  NAMENDA XR   Take 1 Cap by mouth every day.  Dose:  28 mg     mirtazapine 15 MG Tabs  Commonly known as:  REMERON   Take 1 Tab by mouth every bedtime.  Dose:  15 mg     simvastatin 20 MG Tabs  Commonly known as:  ZOCOR   Take 1 Tab by mouth every evening.  Dose:  20 mg     tamsulosin 0.4 MG capsule  Commonly known as:  FLOMAX   Take 1 Cap by mouth ONE-HALF HOUR AFTER BREAKFAST.  Dose:  0.4 mg     vitamin D 2000 UNIT Tabs   TAKE 1 TABLET BY MOUTH ONCE DAILY.        * This list has 2 medication(s) that are the same as other medications prescribed for you. Read the directions carefully, and ask your doctor or other care provider to review them with you.                Allergies  Allergies   Allergen Reactions   • Zolpidem Unspecified     Daytime confusion         DIET  No orders of the defined types were  placed in this encounter.        CONSULTATIONS  Pulmonary  Neurology   Neurosurgery      PROCEDURES  none    LABORATORY  Lab Results   Component Value Date    SODIUM 145 04/27/2019    POTASSIUM 5.0 04/27/2019    CHLORIDE 117 (H) 04/27/2019    CO2 22 04/27/2019    GLUCOSE 140 (H) 04/27/2019    BUN 21 04/27/2019    CREATININE 0.61 04/27/2019        Lab Results   Component Value Date    WBC 26.7 (H) 04/27/2019    HEMOGLOBIN 12.7 (L) 04/27/2019    HEMATOCRIT 38.3 (L) 04/27/2019    PLATELETCT 263 04/27/2019        Total time of the discharge process exceeds 40 minutes.

## 2019-05-26 NOTE — ADDENDUM NOTE
Encounter addended by: Yris Mcwilliams M.D. on: 5/26/2019  1:27 PM<BR>    Actions taken: Sign clinical note

## 2019-05-28 LAB
FUNGUS SPEC CULT: ABNORMAL
FUNGUS SPEC CULT: ABNORMAL
SIGNIFICANT IND 70042: ABNORMAL
SITE SITE: ABNORMAL
SOURCE SOURCE: ABNORMAL

## 2019-06-22 LAB
MYCOBACTERIUM SPEC CULT: NORMAL
RHODAMINE-AURAMINE STN SPEC: NORMAL
SIGNIFICANT IND 70042: NORMAL
SITE SITE: NORMAL
SOURCE SOURCE: NORMAL

## 2022-08-29 NOTE — ASSESSMENT & PLAN NOTE
Patient understands condition, prognosis and need for follow up care. Requiring nicardipine infusion and titration  As needed hydralazine and labetalol IV for blood pressure control  This is likely the etiology of his hemorrhage  Goal systolic blood pressure less than 140

## 2022-11-08 NOTE — CARE PLAN
Problem: Risk for Impaired Mobility  Goal: Ortho/Mobilization Devices  Outcome: PROGRESSING SLOWER THAN EXPECTED         1% lidocaine

## (undated) DEVICE — GOWN WARMING STANDARD FLEX - (30/CA)

## (undated) DEVICE — ELECTRODE 850 FOAM ADHESIVE - HYDROGEL RADIOTRNSPRNT (50/PK)

## (undated) DEVICE — DRAPE STRLE REG TOWEL 18X24 - (10/BX 4BX/CA)"

## (undated) DEVICE — SODIUM CHL. IRRIGATION 0.9% 3000ML (4EA/CA 65CA/PF)

## (undated) DEVICE — TRAY CATHETER FOLEY URINE METER W/STATLOCK 350ML (10EA/CA)

## (undated) DEVICE — DRESSING TRANSPARENT FILM TEGADERM 4 X 4.75" (50EA/BX)"

## (undated) DEVICE — TUBE E-T HI-LO CUFF 7.5MM (10EA/PK)

## (undated) DEVICE — SODIUM CHL IRRIGATION 0.9% 1000ML (12EA/CA)

## (undated) DEVICE — NEPTUNE 4 PORT MANIFOLD - (20/PK)

## (undated) DEVICE — GLOVE BIOGEL INDICATOR SZ 8 SURGICAL PF LTX - (50/BX 4BX/CA)

## (undated) DEVICE — SET LEADWIRE 5 LEAD BEDSIDE DISPOSABLE ECG (1SET OF 5/EA)

## (undated) DEVICE — SYRINGE SAFETY 3 ML 18 GA X 1 1/2 BLUNT LL (100/BX 8BX/CA)

## (undated) DEVICE — SYRINGE SAFETY 10 ML 18 GA X 1 1/2 BLUNT LL (100/BX 4BX/CA)

## (undated) DEVICE — CANISTER SUCTION 3000ML MECHANICAL FILTER AUTO SHUTOFF MEDI-VAC NONSTERILE LF DISP  (40EA/CA)

## (undated) DEVICE — SUTURE 5 TI-CRON HOS-14 - (36/BX)

## (undated) DEVICE — SUTURE 3-0 MONOCRYL PLUS PS-1 - 27 INCH (36/BX)

## (undated) DEVICE — KIT ANESTHESIA W/CIRCUIT & 3/LT BAG W/FILTER (20EA/CA)

## (undated) DEVICE — GLOVE BIOGEL INDICATOR SZ 7SURGICAL PF LTX - (50/BX 4BX/CA)

## (undated) DEVICE — ELECTRODE DUAL RETURN W/ CORD - (50/PK)

## (undated) DEVICE — BLADE SAGITTAL SAW DUAL CUT 75.0 X 25.0MM (1/EA)

## (undated) DEVICE — SUTURE 1 VICRYL PLUS CTX - 8 X 18 INCH (12/BX)

## (undated) DEVICE — GLOVE BIOGEL PI INDICATOR SZ 7.0 SURGICAL PF LF - (50/BX 4BX/CA)

## (undated) DEVICE — GLOVE BIOGEL SZ 7 SURGICAL PF LTX - (50PR/BX 4BX/CA)

## (undated) DEVICE — SENSOR SPO2 NEO LNCS ADHESIVE (20/BX) SEE USER NOTES

## (undated) DEVICE — DERMABOND ADVANCED - (12EA/BX)

## (undated) DEVICE — CHLORAPREP 26 ML APPLICATOR - ORANGE TINT(25/CA)

## (undated) DEVICE — MASK ANESTHESIA ADULT  - (100/CA)

## (undated) DEVICE — SUTURE 2-0 MONOCRYL CT-1

## (undated) DEVICE — GLOVE BIOGEL SZ 8 SURGICAL PF LTX - (50PR/BX 4BX/CA)

## (undated) DEVICE — GLOVE BIOGEL ECLIPSE PF LATEX SIZE 8.0  (50PR/BX)

## (undated) DEVICE — LENS/HOOD FOR SPACESUIT - (32/PK) PEEL AWAY FACE

## (undated) DEVICE — PACK TOTAL HIP - (1/CA)

## (undated) DEVICE — GLOVE BIOGEL SZ 7.5 SURGICAL PF LTX - (50PR/BX 4BX/CA)

## (undated) DEVICE — TIP INTPLS HFLO ML ORFC BTRY - (12/CS)  FOR SURGILAV

## (undated) DEVICE — ADHESIVE DERMABOND HVD MINI (12EA/BX)

## (undated) DEVICE — SLEEVE, VASO, THIGH, MED

## (undated) DEVICE — PROTECTOR ULNA NERVE - (36PR/CA)

## (undated) DEVICE — GLOVE BIOGEL INDICATOR SZ 8.5 SURGICAL PF LTX - (50/BX 4BX/CA)

## (undated) DEVICE — SET EXTENSION WITH 2 PORTS (48EA/CA) ***PART #2C8610 IS A SUBSTITUTE*****

## (undated) DEVICE — KIT ROOM DECONTAMINATION

## (undated) DEVICE — GLOVE BIOGEL SZ 8.5 SURGICAL PF LTX - (50PR/BX 4BX/CA)

## (undated) DEVICE — NEEDLE SPINAL NON-SAFETY 18 GA X 3 IN (25EA/BX)

## (undated) DEVICE — LACTATED RINGERS INJ 1000 ML - (14EA/CA 60CA/PF)

## (undated) DEVICE — PAD LAP STERILE 18 X 18 - (5/PK 40PK/CA)

## (undated) DEVICE — TUBING CLEARLINK DUO-VENT - C-FLO (48EA/CA)

## (undated) DEVICE — SUCTION INSTRUMENT YANKAUER BULBOUS TIP W/O VENT (50EA/CA)

## (undated) DEVICE — HANDPIECE 10FT INTPLS SCT PLS IRRIGATION HAND CONTROL SET (6/PK)

## (undated) DEVICE — Device

## (undated) DEVICE — HEAD HOLDER JUNIOR/ADULT

## (undated) DEVICE — SUTURE GENERAL